# Patient Record
Sex: MALE | Race: BLACK OR AFRICAN AMERICAN | Employment: OTHER | URBAN - METROPOLITAN AREA
[De-identification: names, ages, dates, MRNs, and addresses within clinical notes are randomized per-mention and may not be internally consistent; named-entity substitution may affect disease eponyms.]

---

## 2017-01-26 RX ORDER — LISINOPRIL 5 MG/1
TABLET ORAL
Qty: 30 TAB | Refills: 6 | Status: SHIPPED | OUTPATIENT
Start: 2017-01-26 | End: 2017-08-31 | Stop reason: SDUPTHER

## 2017-02-18 DIAGNOSIS — I10 HYPERTENSION, ESSENTIAL: ICD-10-CM

## 2017-02-20 RX ORDER — AMLODIPINE BESYLATE 5 MG/1
TABLET ORAL
Qty: 90 TAB | Refills: 3 | Status: SHIPPED | OUTPATIENT
Start: 2017-02-20 | End: 2018-02-15 | Stop reason: SDUPTHER

## 2017-03-20 RX ORDER — POTASSIUM CHLORIDE 750 MG/1
TABLET, EXTENDED RELEASE ORAL
Qty: 30 TAB | Refills: 3 | Status: SHIPPED | OUTPATIENT
Start: 2017-03-20 | End: 2017-11-27 | Stop reason: ALTCHOICE

## 2017-08-28 ENCOUNTER — HOSPITAL ENCOUNTER (OUTPATIENT)
Dept: LAB | Age: 77
Discharge: HOME | End: 2017-08-28
Payer: MEDICARE

## 2017-08-28 ENCOUNTER — OFFICE VISIT (OUTPATIENT)
Dept: UROLOGY | Age: 77
End: 2017-08-28

## 2017-08-28 VITALS
HEIGHT: 69 IN | BODY MASS INDEX: 31.7 KG/M2 | DIASTOLIC BLOOD PRESSURE: 75 MMHG | HEART RATE: 65 BPM | SYSTOLIC BLOOD PRESSURE: 130 MMHG | TEMPERATURE: 97.1 F | WEIGHT: 214 LBS | OXYGEN SATURATION: 100 %

## 2017-08-28 DIAGNOSIS — C61 PROSTATE CANCER (HCC): Primary | ICD-10-CM

## 2017-08-28 DIAGNOSIS — C61 PROSTATE CANCER (HCC): ICD-10-CM

## 2017-08-28 LAB
BILIRUB UR QL STRIP: NEGATIVE
GLUCOSE UR-MCNC: NEGATIVE MG/DL
KETONES P FAST UR STRIP-MCNC: NEGATIVE MG/DL
PH UR STRIP: 7 [PH] (ref 4.6–8)
PROT UR QL STRIP: NEGATIVE MG/DL
PSA SERPL-MCNC: 1.2 NG/ML (ref 0–4)
SP GR UR STRIP: 1.01 (ref 1–1.03)
UA UROBILINOGEN AMB POC: NORMAL (ref 0.2–1)
URINALYSIS CLARITY POC: CLEAR
URINALYSIS COLOR POC: YELLOW
URINE BLOOD POC: NEGATIVE
URINE LEUKOCYTES POC: NEGATIVE
URINE NITRITES POC: NEGATIVE

## 2017-08-28 PROCEDURE — 84153 ASSAY OF PSA TOTAL: CPT | Performed by: UROLOGY

## 2017-08-28 NOTE — PROGRESS NOTES
Pia Naas 65 y.o. male     Mr. Fontenot Members seen today for follow-up prostate carcinoma Janusz 6 histology/PSA 4.4 IM RT 2013    Patient is voiding well and has no complaints regarding urination at this time      PSA 2.6 in February 2014  PSA 1.2 in August 2014   PSA 0.7 in August 2015  PSA 1.0 in August 2016        Review of Systems:   CNS: No seizure syncope headaches dizziness or visual changes  Respiratory-no wheezing no shortness of breath  Cardiovascular:hypertension-no chest pains no palpitations  Intestinal:negative no dyspepsia diarrhea or constipation  Urinary: prostate carcinoma 2013  Skeletal: No large joint pain or muscle complaints  Endocrine:negative diabetes  Other:    Allergies: Allergies   Allergen Reactions    Pcn [Penicillins] Rash and Unknown (comments)     Other reaction(s): unknown      Medications:    Current Outpatient Prescriptions   Medication Sig Dispense Refill    amLODIPine (NORVASC) 5 mg tablet take 1 tablet by mouth once daily 90 Tab 3    lisinopril (PRINIVIL, ZESTRIL) 5 mg tablet take 1 tablet by mouth once daily 30 Tab 6    hydroCHLOROthiazide (HYDRODIURIL) 12.5 mg tablet TAKE 1 TABLET BY MOUTH EVERY OTHER DAY 30 Tab 6    metoprolol (LOPRESSOR) 50 mg tablet Take  by mouth two (2) times a day.  simvastatin (ZOCOR) 20 mg tablet Take  by mouth nightly.  aspirin delayed-release 81 mg tablet Take  by mouth daily.  potassium chloride (K-DUR, KLOR-CON) 10 mEq tablet take 1 tablet by mouth every other day 30 Tab 3    econazole nitrate (SPECTAZOLE) 1 % topical cream as needed. Past Medical History:   Diagnosis Date    Cancer of prostate ([facility] [address] 4/2012    radiation- Dr Janessa Padilla Essential hypertension     Gout     Obesity, unspecified     Weight loss has been strongly encouraged by following dietary restrictions and an exercise routine.     Other and unspecified hyperlipidemia     1/12 Low density lipoproteins (LDLs) are at goal, triglycerides are at goal, high density lipoproteins (HDLs) are at goal.      History reviewed. No pertinent surgical history. Family History   Problem Relation Age of Onset    Heart Attack Father 54    Heart Disease Other      Positive family history of heart disesae    Sudden Death Neg Hx         Physical Examination: Well-nourished mature male in no apparent distress    Prostate by OSKAR is large rounded smooth benign consistency nontender-no induration no nodularity  No rectal masses induration or tenderness    Urinalysis: Negative dipstick/nitrite negative    Impression: Prostate carcinoma OZZY 40 years status post IM RT        Plan: PSA today    rtc 1 yr      More than 1/2 of this 15 minute visit was spent in counselling and coordination of care, as described above. Juany Dean MD  -electronically signed-    PLEASE NOTE:  This document has been produced using voice recognition software. Unrecognized errors in transcription may be present.

## 2017-08-28 NOTE — PROGRESS NOTES
Mr. Melissa Troncoso has a reminder for a \"due or due soon\" health maintenance. I have asked that he contact his primary care provider for follow-up on this health maintenance.

## 2017-08-28 NOTE — MR AVS SNAPSHOT
Visit Information Date & Time Provider Department Dept. Phone Encounter #  
 8/28/2017  9:45 AM Kandice Chatman, Yoh[facility] [id number] Your Appointments 10/30/2017  9:15 AM  
ESTABLISHED PATIENT with Tam Chavez MD  
[facility] ([address]) Appt Note: 1 year follow up/Lipid/LFT/A1C  
 [address] [facility] [address]  
  
   
 [address] [address] [address] [address]  
  
    
 8/28/2018  9:30 AM  
Office Visit with Kandice Chatman MD  
[facility] [address]) Appt Note: PSA  
 [address] [place] [address] [address]  
[phone] [address] [place] [address] Upcoming Health Maintenance Date Due DTaP/Tdap/Td series (1 - Tdap) 4/14/1960 ZOSTER VACCINE AGE 60> 2/14/1999 GLAUCOMA SCREENING Q2Y 4/14/2004 Pneumococcal 65+ High/Highest Risk (1 of 2 - PCV13) 4/14/2004 MEDICARE YEARLY EXAM 4/14/2004 INFLUENZA AGE 9 TO ADULT 8/1/2017 Allergies as of 8/28/2017  Review Complete On: 8/28/2017 By: Kandice Chatman MD  
  
 Severity Noted Reaction Type Reactions Pcn [Penicillins]  06/02/2011   Side Effect Rash, Unknown (comments) Other reaction(s): unknown Current Immunizations  Never Reviewed No immunizations on file. Not reviewed this visit You Were Diagnosed With   
  
 Codes Comments Prostate cancer [facility])    -  Primary ICD-10-CM: D61 ICD-9-CM: 971 Vitals BP Pulse Temp Height(growth percentile) Weight(growth percentile) SpO2  
 130/75 (BP 1 Location: Left arm, BP Patient Position: Sitting) 65 97.1 °F (36.2 °C) 5' 9\" (1.753 m) 214 lb (97.1 kg) 100% BMI Smoking Status 31.6 kg/m2 Never Smoker Vitals History BMI and BSA Data Body Mass Index Body Surface Area  
 31.6 kg/m 2 2.17 m 2 Preferred Pharmacy Pharmacy Name Phone [facility] [address] [facility], [facility] [address] [phone] Your Updated Medication List  
  
   
This list is accurate as of: 8/28/17 10:24 AM.  Always use your most recent med list. amLODIPine 5 mg tablet Commonly known as:  NORVASC  
take 1 tablet by mouth once daily  
  
 aspirin delayed-release 81 mg tablet Take  by mouth daily. econazole nitrate 1 % topical cream  
Commonly known as:  Orvilla Munda  
as needed. hydroCHLOROthiazide 12.5 mg tablet Commonly known as:  HYDRODIURIL  
TAKE 1 TABLET BY MOUTH EVERY OTHER DAY  
  
 lisinopril 5 mg tablet Commonly known as:  PRINIVIL, ZESTRIL  
take 1 tablet by mouth once daily  
  
 metoprolol tartrate 50 mg tablet Commonly known as:  LOPRESSOR Take  by mouth two (2) times a day. potassium chloride 10 mEq tablet Commonly known as:  KLOR-CON  
take 1 tablet by mouth every other day  
  
 simvastatin 20 mg tablet Commonly known as:  ZOCOR Take  by mouth nightly. We Performed the Following AMB POC URINALYSIS DIP STICK AUTO W/O MICRO [98941 CPT(R)] IL COLLECTION VENOUS BLOOD,VENIPUNCTURE [id number] CPT(R)] To-Do List   
 08/28/2017 Lab:  PSA, DIAGNOSTIC (PROSTATE SPECIFIC AG) Patient Instructions Prostate Cancer: Care Instructions Your Care Instructions The prostate gland is a small, walnut-shaped organ that lies just below a man's bladder. It surrounds the urethra, the tube that carries urine from the bladder out of the body through the penis. Prostate cancer is the abnormal growth of cells in the prostate gland. Prostate cancer cells can spread within the prostate, to nearby lymph nodes and other tissues, and to other parts of the body. When the cancer hasn't spread outside the prostate, it is called localized prostate cancer. With localized prostate cancer, your options depend on how likely it is that your cancer will grow.  Tests will show if your cancer is likely to grow. · Low-risk cancer isn't likely to grow right away. If your cancer is low-risk, you can choose active surveillance. This means your cancer will be watched closely by your doctor with regular checkups and tests to see if the cancer grows. This choice allows you to delay having surgery or radiation, often for many years. If the cancer grows very slowly, you may never need treatment. · Medium-risk cancer is more likely to grow. Some men with this type of cancer may be able to choose active surveillance. Others may need to choose surgery or radiation. · High-risk cancer is most likely to grow. If you have high-risk cancer, you will likely need to choose surgery or radiation. If your cancer has already spread outside the prostate or to other parts of the body, then you may have other treatments, like chemotherapy or hormone therapy. If you are over age [de-identified] or have other serious health problems, like heart disease, you may choose not to have treatments to cure your cancer. Instead, you can just have treatments to manage your symptoms. This is called watchful waiting. Finding out that you have cancer is scary. You may feel many emotions and may need some help coping. Seek out family, friends, and counselors for support. You also can do things at home to make yourself feel better while you go through treatment. Call the [facility] (9-945.395.9996) or visit its website at [employer] [employer]. [employer] for more information. Follow-up care is a key part of your treatment and safety. Be sure to make and go to all appointments, and call your doctor if you are having problems. It's also a good idea to know your test results and keep a list of the medicines you take. How can you care for yourself at home? · Your doctor will talk to you about your treatment options. You may need to learn more about each of them before you can decide which treatment is best for you. · Take your medicines exactly as prescribed. Call your doctor if you think you are having a problem with your medicine. You will get more details on the specific medicines your doctor prescribes. · Eat healthy food. If you do not feel like eating, try to eat food that has protein and extra calories to keep up your strength and prevent weight loss. Drink liquid meal replacements for extra calories and protein. Try to eat your main meal early. · Take steps to control your stress and workload. Learn relaxation techniques. ¨ Share your feelings. Stress and tension affect our emotions. By expressing your feelings to others, you may be able to understand and cope with them. ¨ Consider joining a support group. Talking about a problem with your spouse, a good friend, or other people with similar problems is a good way to reduce tension and stress. ¨ Express yourself through art. Try writing, crafts, dance, or art to relieve stress. Some dance, writing, or art groups may be available just for people who have cancer. ¨ Be kind to your body and mind. Getting enough sleep, eating a healthy diet, and taking time to do things you enjoy can contribute to an overall feeling of balance in your life and can help reduce stress. ¨ Get help if you need it. Discuss your concerns with your doctor or counselor. · Get some physical activity every day, but do not get too tired. Keep doing the hobbies you enjoy as your energy allows. · If you are vomiting or have diarrhea: ¨ Drink plenty of fluids (enough so that your urine is light yellow or clear like water) to prevent dehydration. Choose water and other caffeine-free clear liquids. If you have kidney, heart, or liver disease and have to limit fluids, talk with your doctor before you increase the amount of fluids you drink. ¨ When you are able to eat, try clear soups, mild foods, and liquids until all symptoms are gone for 12 to 48 hours.  Jell-O, dry toast, crackers, and cooked cereal are also good choices. · If you have not already done so, prepare a list of advance directives. Advance directives are instructions to your doctor and family members about what kind of care you want if you become unable to speak or express yourself. When should you call for help? Call your doctor now or seek immediate medical care if: 
· You cannot urinate. · You have symptoms of a urinary infection. For example: ¨ You have blood or pus in your urine. ¨ You have pain in your back just below your rib cage. This is called flank pain. ¨ You have a fever, chills, or body aches. ¨ It hurts to urinate. ¨ You have groin or belly pain. · You have pain in your back or hips. · Your pain is not controlled. · You are vomiting or nauseated. Watch closely for changes in your health, and be sure to contact your doctor if: 
· You have pain when you ejaculate. · You have trouble starting or controlling your urine. Where can you learn more? Go to http://karlos-sammi.info/. Enter V141 in the search box to learn more about \"Prostate Cancer: Care Instructions. \" Current as of: July 26, 2016 Content Version: 11.3 © 2492-6905 [employer]. Care instructions adapted under license by [employer] (which disclaims liability or warranty for this information). If you have questions about a medical condition or this instruction, always ask your healthcare professional. [address] any warranty or liability for your use of this information. Introducing [facility]! [facility] introduces "[email or web address]" patient portal. Now you can access parts of your medical record, email your doctor's office, and request medication refills online. 1. In your internet browser, go to [email or web address] 2. Click on the First Time User? Click Here link in the Sign In box. You will see the New Member Sign Up page. 3. Enter your Sporting Mouth Access Code exactly as it appears below. You will not need to use this code after youve completed the sign-up process. If you do not sign up before the expiration date, you must request a new code. · Sporting Mouth Access Code: [email or web address] Expires: 11/26/2017  9:42 AM 
 
4. Enter the last four digits of your Social Security Number (xxxx) and Date of Birth (mm/dd/yyyy) as indicated and click Submit. You will be taken to the next sign-up page. 5. Create a SendHubt ID. This will be your Sporting Mouth login ID and cannot be changed, so think of one that is secure and easy to remember. 6. Create a Sporting Mouth password. You can change your password at any time. 7. Enter your Password Reset Question and Answer. This can be used at a later time if you forget your password. 8. Enter your e-mail address. You will receive e-mail notification when new information is available in [address] 9. Click Sign Up. You can now view and download portions of your medical record. 10. Click the Download Summary menu link to download a portable copy of your medical information. If you have questions, please visit the Frequently Asked Questions section of the Sporting Mouth website. Remember, Sporting Mouth is NOT to be used for urgent needs. For medical emergencies, dial 911. Now available from your iPhone and Android! Please provide this summary of care documentation to your next provider. Your primary care clinician is listed as Socorro Montano. If you have any questions after today's visit, please call [phone].

## 2017-08-28 NOTE — PATIENT INSTRUCTIONS
Prostate Cancer: Care Instructions  Your Care Instructions    The prostate gland is a small, walnut-shaped organ that lies just below a man's bladder. It surrounds the urethra, the tube that carries urine from the bladder out of the body through the penis. Prostate cancer is the abnormal growth of cells in the prostate gland. Prostate cancer cells can spread within the prostate, to nearby lymph nodes and other tissues, and to other parts of the body. When the cancer hasn't spread outside the prostate, it is called localized prostate cancer. With localized prostate cancer, your options depend on how likely it is that your cancer will grow. Tests will show if your cancer is likely to grow. · Low-risk cancer isn't likely to grow right away. If your cancer is low-risk, you can choose active surveillance. This means your cancer will be watched closely by your doctor with regular checkups and tests to see if the cancer grows. This choice allows you to delay having surgery or radiation, often for many years. If the cancer grows very slowly, you may never need treatment. · Medium-risk cancer is more likely to grow. Some men with this type of cancer may be able to choose active surveillance. Others may need to choose surgery or radiation. · High-risk cancer is most likely to grow. If you have high-risk cancer, you will likely need to choose surgery or radiation. If your cancer has already spread outside the prostate or to other parts of the body, then you may have other treatments, like chemotherapy or hormone therapy. If you are over age [de-identified] or have other serious health problems, like heart disease, you may choose not to have treatments to cure your cancer. Instead, you can just have treatments to manage your symptoms. This is called watchful waiting. Finding out that you have cancer is scary. You may feel many emotions and may need some help coping. Seek out family, friends, and counselors for support.  You also can do things at home to make yourself feel better while you go through treatment. Call the [facility] (5-471.507.9294) or visit its website at [employer] [employer]. [employer] for more information. Follow-up care is a key part of your treatment and safety. Be sure to make and go to all appointments, and call your doctor if you are having problems. It's also a good idea to know your test results and keep a list of the medicines you take. How can you care for yourself at home? · Your doctor will talk to you about your treatment options. You may need to learn more about each of them before you can decide which treatment is best for you. · Take your medicines exactly as prescribed. Call your doctor if you think you are having a problem with your medicine. You will get more details on the specific medicines your doctor prescribes. · Eat healthy food. If you do not feel like eating, try to eat food that has protein and extra calories to keep up your strength and prevent weight loss. Drink liquid meal replacements for extra calories and protein. Try to eat your main meal early. · Take steps to control your stress and workload. Learn relaxation techniques. ¨ Share your feelings. Stress and tension affect our emotions. By expressing your feelings to others, you may be able to understand and cope with them. ¨ Consider joining a support group. Talking about a problem with your spouse, a good friend, or other people with similar problems is a good way to reduce tension and stress. ¨ Express yourself through art. Try writing, crafts, dance, or art to relieve stress. Some dance, writing, or art groups may be available just for people who have cancer. ¨ Be kind to your body and mind. Getting enough sleep, eating a healthy diet, and taking time to do things you enjoy can contribute to an overall feeling of balance in your life and can help reduce stress. ¨ Get help if you need it.  Discuss your concerns with your doctor or counselor. · Get some physical activity every day, but do not get too tired. Keep doing the hobbies you enjoy as your energy allows. · If you are vomiting or have diarrhea:  ¨ Drink plenty of fluids (enough so that your urine is light yellow or clear like water) to prevent dehydration. Choose water and other caffeine-free clear liquids. If you have kidney, heart, or liver disease and have to limit fluids, talk with your doctor before you increase the amount of fluids you drink. ¨ When you are able to eat, try clear soups, mild foods, and liquids until all symptoms are gone for 12 to 48 hours. Jell-O, dry toast, crackers, and cooked cereal are also good choices. · If you have not already done so, prepare a list of advance directives. Advance directives are instructions to your doctor and family members about what kind of care you want if you become unable to speak or express yourself. When should you call for help? Call your doctor now or seek immediate medical care if:  · You cannot urinate. · You have symptoms of a urinary infection. For example:  ¨ You have blood or pus in your urine. ¨ You have pain in your back just below your rib cage. This is called flank pain. ¨ You have a fever, chills, or body aches. ¨ It hurts to urinate. ¨ You have groin or belly pain. · You have pain in your back or hips. · Your pain is not controlled. · You are vomiting or nauseated. Watch closely for changes in your health, and be sure to contact your doctor if:  · You have pain when you ejaculate. · You have trouble starting or controlling your urine. Where can you learn more? Go to http://karlos-sammi.info/. Enter V141 in the search box to learn more about \"Prostate Cancer: Care Instructions. \"  Current as of: July 26, 2016  Content Version: 11.3  © 7782-4946 [employer].  Care instructions adapted under license by [employer] (which disclaims liability or warranty for this information). If you have questions about a medical condition or this instruction, always ask your healthcare professional. [place] [address] any warranty or liability for your use of this information.

## 2017-08-31 RX ORDER — LISINOPRIL 5 MG/1
TABLET ORAL
Qty: 30 TAB | Refills: 6 | Status: SHIPPED | OUTPATIENT
Start: 2017-08-31 | End: 2018-03-26 | Stop reason: SDUPTHER

## 2017-11-27 ENCOUNTER — OFFICE VISIT (OUTPATIENT)
Dept: CARDIOLOGY CLINIC | Age: 77
End: 2017-11-27

## 2017-11-27 VITALS
DIASTOLIC BLOOD PRESSURE: 67 MMHG | HEIGHT: 69 IN | SYSTOLIC BLOOD PRESSURE: 132 MMHG | BODY MASS INDEX: 31.7 KG/M2 | HEART RATE: 74 BPM | WEIGHT: 214 LBS

## 2017-11-27 DIAGNOSIS — E78.00 PURE HYPERCHOLESTEROLEMIA: ICD-10-CM

## 2017-11-27 DIAGNOSIS — E66.9 OBESITY (BMI 30.0-34.9): ICD-10-CM

## 2017-11-27 DIAGNOSIS — I10 ESSENTIAL HYPERTENSION, BENIGN: ICD-10-CM

## 2017-11-27 DIAGNOSIS — I25.10 ATHEROSCLEROSIS OF NATIVE CORONARY ARTERY OF NATIVE HEART WITHOUT ANGINA PECTORIS: Primary | ICD-10-CM

## 2017-11-27 RX ORDER — DEXTROMETHORPHAN HYDROBROMIDE, GUAIFENESIN 5; 100 MG/5ML; MG/5ML
650 LIQUID ORAL EVERY 8 HOURS
COMMUNITY
End: 2018-11-26

## 2017-11-27 NOTE — MR AVS SNAPSHOT
Visit Information Date & Time Provider Department Dept. Phone Encounter #  
 11/27/2017  9:00 AM Napoleon Rios MD [facility] [phone] Follow-up Instructions Return in about 1 year (around 11/27/2018), or if symptoms worsen or fail to improve, for with ekg, post test.  
  
Your Appointments 8/28/2018  9:30 AM  
Office Visit with Gayle Figueroa MD  
[facility] [address] [facility]) Appt Note: PSA  
 [address] [place] [address] [address]  
[phone] [address] [facility] [address] Upcoming Health Maintenance Date Due DTaP/Tdap/Td series (1 - Tdap) 4/14/1960 ZOSTER VACCINE AGE 60> 2/14/1999 GLAUCOMA SCREENING Q2Y 4/14/2004 Pneumococcal 65+ High/Highest Risk (1 of 2 - PCV13) 4/14/2004 MEDICARE YEARLY EXAM 4/14/2004 Influenza Age 5 to Adult 8/1/2017 Allergies as of 11/27/2017  Review Complete On: 11/27/2017 By: Napoleon Rios MD  
  
 Severity Noted Reaction Type Reactions Pcn [Penicillins]  06/02/2011   Side Effect Rash, Unknown (comments) Other reaction(s): unknown Current Immunizations  Never Reviewed No immunizations on file. Not reviewed this visit You Were Diagnosed With   
  
 Codes Comments Atherosclerosis of native coronary artery of native heart without angina pectoris    -  Primary ICD-10-CM: I25.10 ICD-9-CM: 414.01 11/17; 11/16 stable symptoms; 2006 noncritical 
stable EKG Essential hypertension, benign     ICD-10-CM: I10 
ICD-9-CM: 401.1 Pure hypercholesterolemia     ICD-10-CM: E78.00 ICD-9-CM: 272.0 11/16 LDL74; 9/15 LDL72;   
 Obesity (BMI 30.0-34.9)     ICD-10-CM: M08.9 ICD-9-CM: 278.00 Weight loss has been strongly encouraged by following dietary restrictions and an exercise routine. Vitals BP Pulse Height(growth percentile) Weight(growth percentile) BMI Smoking Status 132/67 74 5' 9\" (1.753 m) 214 lb (97.1 kg) 31.6 kg/m2 Never Smoker Vitals History BMI and BSA Data Body Mass Index Body Surface Area  
 31.6 kg/m 2 2.17 m 2 Preferred Pharmacy Pharmacy Name Phone [facility] [facility] [address] [phone] Your Updated Medication List  
  
   
This list is accurate as of: 11/27/17  9:36 AM.  Always use your most recent med list.  
  
  
  
  
 acetaminophen 650 mg Tber Commonly known as:  TYLENOL Take 650 mg by mouth every eight (8) hours. amLODIPine 5 mg tablet Commonly known as:  NORVASC  
take 1 tablet by mouth once daily  
  
 aspirin delayed-release 81 mg tablet Take  by mouth daily. hydroCHLOROthiazide 12.5 mg tablet Commonly known as:  HYDRODIURIL  
TAKE 1 TABLET BY MOUTH EVERY OTHER DAY  
  
 lisinopril 5 mg tablet Commonly known as:  PRINIVIL, ZESTRIL  
take 1 tablet by mouth once daily  
  
 metoprolol tartrate 50 mg tablet Commonly known as:  LOPRESSOR Take  by mouth two (2) times a day. simvastatin 20 mg tablet Commonly known as:  ZOCOR Take  by mouth nightly. We Performed the Following AMB POC EKG ROUTINE W/ 12 LEADS, INTER & REP [08800 CPT(R)] Follow-up Instructions Return in about 1 year (around 11/27/2018), or if symptoms worsen or fail to improve, for with ekg, post test.  
  
To-Do List   
 Around 11/27/2017 Lab:  CBC W/O DIFF Around 11/27/2017 Lab:  HEPATIC FUNCTION PANEL Around 11/27/2017 Lab:  LIPID PANEL Around 11/27/2017 Lab:  METABOLIC PANEL, BASIC Patient Instructions Medications Discontinued During This Encounter Medication Reason  econazole nitrate (SPECTAZOLE) 1 % topical cream Therapy Completed  potassium chloride (K-DUR, KLOR-CON) 10 mEq tablet Discontinued by Another Clinician Orders Placed This Encounter  LIPID PANEL Standing Status:   Future Standing Expiration Date:   2/27/2018  
 HEPATIC FUNCTION PANEL Standing Status:   Future Standing Expiration Date:   2/27/2018  METABOLIC PANEL, BASIC Standing Status:   Future Standing Expiration Date:   2/27/2018  CBC W/O DIFF Standing Status:   Future Standing Expiration Date:   2/27/2018  AMB POC EKG ROUTINE W/ 12 LEADS, INTER & REP Order Specific Question:   Reason for Exam: Answer:   1year follow up Body Mass Index: Care Instructions Your Care Instructions Body mass index (BMI) can help you see if your weight is raising your risk for health problems. It uses a formula to compare how much you weigh with how tall you are. · A BMI lower than 18.5 is considered underweight. · A BMI between 18.5 and 24.9 is considered healthy. · A BMI between 25 and 29.9 is considered overweight. A BMI of 30 or higher is considered obese. If your BMI is in the normal range, it means that you have a lower risk for weight-related health problems. If your BMI is in the overweight or obese range, you may be at increased risk for weight-related health problems, such as high blood pressure, heart disease, stroke, arthritis or joint pain, and diabetes. If your BMI is in the underweight range, you may be at increased risk for health problems such as fatigue, lower protection (immunity) against illness, muscle loss, bone loss, hair loss, and hormone problems. BMI is just one measure of your risk for weight-related health problems. You may be at higher risk for health problems if you are not active, you eat an unhealthy diet, or you drink too much alcohol or use tobacco products. Follow-up care is a key part of your treatment and safety. Be sure to make and go to all appointments, and call your doctor if you are having problems. It's also a good idea to know your test results and keep a list of the medicines you take. How can you care for yourself at home? · Practice healthy eating habits. This includes eating plenty of fruits, vegetables, whole grains, lean protein, and low-fat dairy. · If your doctor recommends it, get more exercise. Walking is a good choice. Bit by bit, increase the amount you walk every day. Try for at least 30 minutes on most days of the week. · Do not smoke. Smoking can increase your risk for health problems. If you need help quitting, talk to your doctor about stop-smoking programs and medicines. These can increase your chances of quitting for good. · Limit alcohol to 2 drinks a day for men and 1 drink a day for women. Too much alcohol can cause health problems. If you have a BMI higher than 25 · Your doctor may do other tests to check your risk for weight-related health problems. This may include measuring the distance around your waist. A waist measurement of more than 40 inches in men or 35 inches in women can increase the risk of weight-related health problems. · Talk with your doctor about steps you can take to stay healthy or improve your health. You may need to make lifestyle changes to lose weight and stay healthy, such as changing your diet and getting regular exercise. If you have a BMI lower than 18.5 · Your doctor may do other tests to check your risk for health problems. · Talk with your doctor about steps you can take to stay healthy or improve your health. You may need to make lifestyle changes to gain or maintain weight and stay healthy, such as getting more healthy foods in your diet and doing exercises to build muscle. Where can you learn more? Go to http://karlos-sammi.info/. Enter S176 in the search box to learn more about \"Body Mass Index: Care Instructions. \" Current as of: October 13, 2016 Content Version: 11.4 © 5008-4937 [employer].  Care instructions adapted under license by [employer] (which disclaims liability or warranty for this information). If you have questions about a medical condition or this instruction, always ask your healthcare professional. [address] any warranty or liability for your use of this information. Introducing [facility] SERVICES! Mathew Jolly introduces Adzilla patient portal. Now you can access parts of your medical record, email your doctor's office, and request medication refills online. 1. In your internet browser, go to [email or web address] 2. Click on the First Time User? Click Here link in the Sign In box. You will see the New Member Sign Up page. 3. Enter your Adzilla Access Code exactly as it appears below. You will not need to use this code after youve completed the sign-up process. If you do not sign up before the expiration date, you must request a new code. · Adzilla Access Code: [email or web address] Expires: 2/25/2018  8:44 AM 
 
4. Enter the last four digits of your Social Security Number (xxxx) and Date of Birth (mm/dd/yyyy) as indicated and click Submit. You will be taken to the next sign-up page. 5. Create a Adzilla ID. This will be your Adzilla login ID and cannot be changed, so think of one that is secure and easy to remember. 6. Create a Adzilla password. You can change your password at any time. 7. Enter your Password Reset Question and Answer. This can be used at a later time if you forget your password. 8. Enter your e-mail address. You will receive e-mail notification when new information is available in [address]. 9. Click Sign Up. You can now view and download portions of your medical record. 10. Click the Download Summary menu link to download a portable copy of your medical information. If you have questions, please visit the Frequently Asked Questions section of the Adzilla website. Remember, Adzilla is NOT to be used for urgent needs. For medical emergencies, dial 911. Now available from your iPhone and Android! Please provide this summary of care documentation to your next provider. Your primary care clinician is listed as [place]ont Keon. If you have any questions after today's visit, please call [phone].

## 2017-11-27 NOTE — PROGRESS NOTES
1. Have you been to the ER, urgent care clinic since your last visit? Hospitalized since your last visit?    no    2. Have you seen or consulted any other health care providers outside of the [address] [place] [address] since your last visit? Include any pap smears or colon screening. Yes, pcp    3. Since your last visit, have you had any of the following symptoms? Some swelling         4. Have you had any blood work, X-rays or cardiac testing?     no       5. Where do you normally have your labs drawn? [facility]    6. Do you need any refills today?    no

## 2017-11-27 NOTE — LETTER
Quin[place]moshe Fuelling 1940 11/27/2017 Dear MD Gayle Javed MD 
 
I had the pleasure of evaluating  Mr. Maria Guadalupe Feliciano in office today. Below are the relevant portions of my assessment and plan of care. ICD-10-CM ICD-9-CM 1. Atherosclerosis of native coronary artery of native heart without angina pectoris I25.10 414.01   
 11/17; 11/16 stable symptoms; 2006 noncritical 
stable EKG 2. Essential hypertension, benign I10 401.1 AMB POC EKG ROUTINE W/ 12 LEADS, INTER & REP  
   METABOLIC PANEL, BASIC  
   CBC W/O DIFF 3. Pure hypercholesterolemia E78.00 272.0 LIPID PANEL  
   HEPATIC FUNCTION PANEL  
 11/16 LDL74; 9/15 LDL72;   
4. Obesity (BMI 30.0-34. 9) E66.9 278.00 Weight loss has been strongly encouraged by following dietary restrictions and an exercise routine. Current Outpatient Prescriptions Medication Sig Dispense Refill  acetaminophen (TYLENOL) 650 mg TbER Take 650 mg by mouth every eight (8) hours.  lisinopril (PRINIVIL, ZESTRIL) 5 mg tablet take 1 tablet by mouth once daily 30 Tab 6  
 amLODIPine (NORVASC) 5 mg tablet take 1 tablet by mouth once daily 90 Tab 3  
 hydroCHLOROthiazide (HYDRODIURIL) 12.5 mg tablet TAKE 1 TABLET BY MOUTH EVERY OTHER DAY 30 Tab 6  
 metoprolol (LOPRESSOR) 50 mg tablet Take  by mouth two (2) times a day.  simvastatin (ZOCOR) 20 mg tablet Take  by mouth nightly.  aspirin delayed-release 81 mg tablet Take  by mouth daily. Orders Placed This Encounter  LIPID PANEL Standing Status:   Future Standing Expiration Date:   2/27/2018  
 HEPATIC FUNCTION PANEL Standing Status:   Future Standing Expiration Date:   2/27/2018  METABOLIC PANEL, BASIC Standing Status:   Future Standing Expiration Date:   2/27/2018  CBC W/O DIFF Standing Status:   Future Standing Expiration Date:   2/27/2018  AMB POC EKG ROUTINE W/ 12 LEADS, INTER & REP  
 Order Specific Question:   Reason for Exam: Answer:   1year follow up  acetaminophen (TYLENOL) 650 mg TbER Sig: Take 650 mg by mouth every eight (8) hours. If you have questions, please do not hesitate to call me. I look forward to following Mr. Dariana Olivas along with you. Sincerely, Rosalinda Melendez MD

## 2017-11-27 NOTE — PROGRESS NOTES
HISTORY OF PRESENT ILLNESS  Emi Sanchez is a 65 y.o. male. HPI Comments: Left deltoid area pains off and on but likely positional as he quilts with extended arms; 5/16 resolved    Hypertension   The history is provided by the patient and medical records. This is a chronic problem. The problem has not changed since onset. Pertinent negatives include no chest pain and no shortness of breath. Cholesterol Problem   The history is provided by the medical records. This is a chronic problem. Pertinent negatives include no chest pain and no shortness of breath. Follow Up Chronic Condition   The history is provided by the medical records (cad). Pertinent negatives include no chest pain and no shortness of breath. Chest Pain (Angina)    The history is provided by the patient. This is a recurrent problem. The current episode started more than 1 week ago (1/16). The problem has been resolved. Duration of episode(s) is 3 minutes. The problem occurs every several days. The pain is associated with movement (positional, sitting in a recliner). The pain is present in the lateral region and left side. The quality of the pain is described as sharp. The pain does not radiate. Pertinent negatives include no claudication, no cough, no diaphoresis, no dizziness, no fever, no malaise/fatigue, no nausea, no orthopnea, no palpitations, no PND, no shortness of breath and no vomiting. He has tried rest for the symptoms. The treatment provided significant relief. Review of Systems   Constitutional: Negative for chills, diaphoresis, fever, malaise/fatigue and weight loss. HENT: Negative for nosebleeds. Eyes: Negative for discharge. Respiratory: Negative for cough, shortness of breath and wheezing. Cardiovascular: Negative for chest pain, palpitations, orthopnea, claudication, leg swelling and PND. Gastrointestinal: Negative for diarrhea, nausea and vomiting. Genitourinary: Negative for dysuria and hematuria. Musculoskeletal: Negative for joint pain. Skin: Negative for rash. Neurological: Negative for dizziness, seizures and loss of consciousness. Endo/Heme/Allergies: Negative for polydipsia. Does not bruise/bleed easily. Psychiatric/Behavioral: Negative for depression and substance abuse. The patient does not have insomnia. Allergies   Allergen Reactions    Pcn [Penicillins] Rash and Unknown (comments)     Other reaction(s): unknown       Family History   Problem Relation Age of Onset    Heart Attack Father 54    Heart Disease Other      Positive family history of heart disesae    Sudden Death Neg Hx        Social History   Substance Use Topics    Smoking status: Never Smoker    Smokeless tobacco: Never Used    Alcohol use No        Current Outpatient Prescriptions   Medication Sig    acetaminophen (TYLENOL) 650 mg TbER Take 650 mg by mouth every eight (8) hours.  lisinopril (PRINIVIL, ZESTRIL) 5 mg tablet take 1 tablet by mouth once daily    amLODIPine (NORVASC) 5 mg tablet take 1 tablet by mouth once daily    hydroCHLOROthiazide (HYDRODIURIL) 12.5 mg tablet TAKE 1 TABLET BY MOUTH EVERY OTHER DAY    metoprolol (LOPRESSOR) 50 mg tablet Take  by mouth two (2) times a day.  simvastatin (ZOCOR) 20 mg tablet Take  by mouth nightly.  aspirin delayed-release 81 mg tablet Take  by mouth daily. No current facility-administered medications for this visit. History reviewed. No pertinent surgical history.     Diagnostic Studies:  CARDIOLOGY STUDIES 2/10/2016 9/19/2013 6/1/2010 10/1/2007 10/1/2006   EKG Result SR, WNL - - - -   Myocardial Perfusion Scan Result - - nl scan probably normal scan -   Cardiac Cath Result - - - - non critical CAD   Exercise Nuclear Stress Test Result - reduced capacity, fixed inf defect, nl EF - - -   Some recent data might be hidden       Visit Vitals    /67    Pulse 74    Ht 5' 9\" (1.753 m)    Wt 97.1 kg (214 lb)    BMI 31.6 kg/m2        Sujit Schulz has a reminder for a \"due or due soon\" health maintenance. I have asked that he contact his primary care provider for follow-up on this health maintenance. Physical Exam   Constitutional: He is oriented to person, place, and time. He appears well-developed and well-nourished. No distress. obese   HENT:   Head: Normocephalic and atraumatic. Mouth/Throat: Normal dentition. Eyes: Right eye exhibits no discharge. Left eye exhibits no discharge. No scleral icterus. Neck: Neck supple. No JVD present. Carotid bruit is not present. No thyromegaly present. Cardiovascular: Normal rate, regular rhythm, S1 normal, S2 normal, normal heart sounds and intact distal pulses. Exam reveals no gallop and no friction rub. No murmur heard. Pulmonary/Chest: Effort normal and breath sounds normal. He has no wheezes. He has no rales. Abdominal: Soft. He exhibits no mass. There is no tenderness. Musculoskeletal: He exhibits no edema. Lymphadenopathy:        Right cervical: No superficial cervical adenopathy present. Left cervical: No superficial cervical adenopathy present. Neurological: He is alert and oriented to person, place, and time. Skin: Skin is warm and dry. No rash noted. Psychiatric: He has a normal mood and affect. His behavior is normal.       ASSESSMENT and PLAN          Diagnoses and all orders for this visit:    1. Atherosclerosis of native coronary artery of native heart without angina pectoris  Comments:  11/17; 11/16 stable symptoms; 2006 noncritical  stable EKG    2. Essential hypertension, benign  -     AMB POC EKG ROUTINE W/ 12 LEADS, INTER & REP  -     METABOLIC PANEL, BASIC; Future  -     CBC W/O DIFF; Future    3. Pure hypercholesterolemia  Comments:  11/16 EGQ63; 9/15 FCN31;   Orders:  -     LIPID PANEL; Future  -     HEPATIC FUNCTION PANEL; Future    4. Obesity (BMI 30.0-34. 9)  Comments:  Weight loss has been strongly encouraged by following dietary restrictions and an exercise routine. Pertinent laboratory and test data reviewed and discussed with patient.   See patient instructions also for other medical advice given    Medications Discontinued During This Encounter   Medication Reason    econazole nitrate (SPECTAZOLE) 1 % topical cream Therapy Completed    potassium chloride (K-DUR, KLOR-CON) 10 mEq tablet Discontinued by Another Clinician       Follow-up Disposition:  Return in about 1 year (around 11/27/2018), or if symptoms worsen or fail to improve, for with ekg, post test.

## 2017-11-27 NOTE — PATIENT INSTRUCTIONS
Medications Discontinued During This Encounter   Medication Reason    econazole nitrate (SPECTAZOLE) 1 % topical cream Therapy Completed    potassium chloride (K-DUR, KLOR-CON) 10 mEq tablet Discontinued by Another Clinician       Orders Placed This Encounter    LIPID PANEL     Standing Status:   Future     Standing Expiration Date:   2/27/2018    HEPATIC FUNCTION PANEL     Standing Status:   Future     Standing Expiration Date:   2/61/1813    METABOLIC PANEL, BASIC     Standing Status:   Future     Standing Expiration Date:   2/27/2018    CBC W/O DIFF     Standing Status:   Future     Standing Expiration Date:   2/27/2018    AMB POC EKG ROUTINE W/ 12 LEADS, INTER & REP     Order Specific Question:   Reason for Exam:     Answer:   1year follow up          Body Mass Index: Care Instructions  Your Care Instructions    Body mass index (BMI) can help you see if your weight is raising your risk for health problems. It uses a formula to compare how much you weigh with how tall you are. · A BMI lower than 18.5 is considered underweight. · A BMI between 18.5 and 24.9 is considered healthy. · A BMI between 25 and 29.9 is considered overweight. A BMI of 30 or higher is considered obese. If your BMI is in the normal range, it means that you have a lower risk for weight-related health problems. If your BMI is in the overweight or obese range, you may be at increased risk for weight-related health problems, such as high blood pressure, heart disease, stroke, arthritis or joint pain, and diabetes. If your BMI is in the underweight range, you may be at increased risk for health problems such as fatigue, lower protection (immunity) against illness, muscle loss, bone loss, hair loss, and hormone problems. BMI is just one measure of your risk for weight-related health problems.  You may be at higher risk for health problems if you are not active, you eat an unhealthy diet, or you drink too much alcohol or use tobacco products. Follow-up care is a key part of your treatment and safety. Be sure to make and go to all appointments, and call your doctor if you are having problems. It's also a good idea to know your test results and keep a list of the medicines you take. How can you care for yourself at home? · Practice healthy eating habits. This includes eating plenty of fruits, vegetables, whole grains, lean protein, and low-fat dairy. · If your doctor recommends it, get more exercise. Walking is a good choice. Bit by bit, increase the amount you walk every day. Try for at least 30 minutes on most days of the week. · Do not smoke. Smoking can increase your risk for health problems. If you need help quitting, talk to your doctor about stop-smoking programs and medicines. These can increase your chances of quitting for good. · Limit alcohol to 2 drinks a day for men and 1 drink a day for women. Too much alcohol can cause health problems. If you have a BMI higher than 25  · Your doctor may do other tests to check your risk for weight-related health problems. This may include measuring the distance around your waist. A waist measurement of more than 40 inches in men or 35 inches in women can increase the risk of weight-related health problems. · Talk with your doctor about steps you can take to stay healthy or improve your health. You may need to make lifestyle changes to lose weight and stay healthy, such as changing your diet and getting regular exercise. If you have a BMI lower than 18.5  · Your doctor may do other tests to check your risk for health problems. · Talk with your doctor about steps you can take to stay healthy or improve your health. You may need to make lifestyle changes to gain or maintain weight and stay healthy, such as getting more healthy foods in your diet and doing exercises to build muscle. Where can you learn more? Go to http://karlos-sammi.info/.   Enter S176 in the search box to learn more about \"Body Mass Index: Care Instructions. \"  Current as of: October 13, 2016  Content Version: 11.4  © 5039-9853 [employer], [employer]. Care instructions adapted under license by [employer] (which disclaims liability or warranty for this information). If you have questions about a medical condition or this instruction, always ask your healthcare professional. [address] any warranty or liability for your use of this information.

## 2017-11-29 LAB
A-G RATIO,AGRAT: 1.5 RATIO (ref 1.1–2.6)
ALBUMIN SERPL-MCNC: 4.5 G/DL (ref 3.5–5)
ALP SERPL-CCNC: 58 U/L (ref 40–125)
ALT SERPL-CCNC: 14 U/L (ref 5–40)
ANION GAP SERPL CALC-SCNC: 10.2 MMOL/L
AST SERPL W P-5'-P-CCNC: 20 U/L (ref 10–37)
BILIRUB SERPL-MCNC: 1 MG/DL (ref 0.2–1.2)
BILIRUBIN, DIRECT,CBIL: <0.2 MG/DL (ref 0–0.3)
BUN SERPL-MCNC: 13 MG/DL (ref 6–22)
CALCIUM SERPL-MCNC: 9.8 MG/DL (ref 8.4–10.4)
CHLORIDE SERPL-SCNC: 101 MMOL/L (ref 98–110)
CHOLEST SERPL-MCNC: 142 MG/DL (ref 110–200)
CO2 SERPL-SCNC: 28 MMOL/L (ref 20–32)
CREAT SERPL-MCNC: 0.8 MG/DL (ref 0.8–1.6)
ERYTHROCYTE [DISTWIDTH] IN BLOOD BY AUTOMATED COUNT: 14.4 % (ref 10–16)
GFRAA, 66117: >60
GFRNA, 66118: >60
GLOBULIN,GLOB: 3 G/DL (ref 2–4)
GLUCOSE SERPL-MCNC: 97 MG/DL (ref 70–99)
HCT VFR BLD AUTO: 37.2 % (ref 37.8–52.2)
HDLC SERPL-MCNC: 62 MG/DL (ref 40–59)
HGB BLD-MCNC: 12.9 G/DL (ref 12.6–17.1)
LDLC SERPL CALC-MCNC: 66 MG/DL (ref 50–99)
MCH RBC QN AUTO: 31 PG (ref 26–34)
MCHC RBC AUTO-ENTMCNC: 35 G/DL (ref 32–36)
MCV RBC AUTO: 90 FL (ref 80–95)
PLATELET # BLD AUTO: 147 K/UL (ref 140–440)
PMV BLD AUTO: 11.8 FL (ref 6–10.8)
POTASSIUM SERPL-SCNC: 4 MMOL/L (ref 3.5–5.5)
PROT SERPL-MCNC: 7.5 G/DL (ref 6.2–8.1)
RBC # BLD AUTO: 4.13 M/UL (ref 3.8–5.8)
SODIUM SERPL-SCNC: 139 MMOL/L (ref 133–145)
TRIGL SERPL-MCNC: 69 MG/DL (ref 40–149)
VLDLC SERPL CALC-MCNC: 14 MG/DL (ref 8–30)
WBC # BLD AUTO: 5.8 K/UL (ref 4–11)

## 2018-01-23 ENCOUNTER — HOSPITAL ENCOUNTER (OUTPATIENT)
Dept: PHYSICAL THERAPY | Age: 78
Discharge: HOME | End: 2018-01-23
Payer: MEDICARE

## 2018-01-23 PROCEDURE — G8978 MOBILITY CURRENT STATUS: HCPCS

## 2018-01-23 PROCEDURE — G8979 MOBILITY GOAL STATUS: HCPCS

## 2018-01-23 PROCEDURE — 97140 MANUAL THERAPY 1/> REGIONS: CPT

## 2018-01-23 PROCEDURE — 97162 PT EVAL MOD COMPLEX 30 MIN: CPT

## 2018-01-23 PROCEDURE — 97110 THERAPEUTIC EXERCISES: CPT

## 2018-01-23 NOTE — PROGRESS NOTES
PT DAILY TREATMENT NOTE - [facility]     Patient Name: Toney Vaughn  Date:2018  : 1940  [x]  Patient  Verified  Payor: [place] MEDICARE / Plan: VA MEDICARE PART A & B / Product Type: Medicare /    In time:1200  Out time:1252  Total Treatment Time (min): 52  Total Timed Codes (min): 25  1:1 Treatment Time ([facility] only): 46   Visit #: 1 of 12    Treatment Area: Pain in right hip [M25.551]    Physical Therapy Evaluation - Hip    SUBJECTIVE      Any medication changes, allergies to medications, adverse drug reactions, diagnosis change, or new procedure performed?: [x] No    [] Yes (see summary sheet for update)    Subjective functional status/changes:     PLOF: No previous history of low back pain/hip pain, No history of sleep disturbances, No limitations in ambulation tolerance, Quilting, Retired  Current Functional Status: Limitations in ambulation tolerance  Work Hx: Retired (Former teacher)  Living Situation: Lives in [facility]  Comorbidities: Arthritis, BMI>30, Prostate Cancer (remission)  Medications: Tylenol Arthritis (PRN)    Pain Intensity (0-10, VAS): Current 2, Worst 2, Best 10    Patient Goals: \"Get rid of the pain\"    Posture/Observation:    BP: 138/82 mmHg  Gait:  [x] Normal        Functional Tests:  1.  SLS: L <2 sec / R <2 sec    Neurological Screen:  Reflexes: L/R Patellar 3+, L Achilles 2+, R Achilles 3+  Sensation: Intact, symmetrical sensation L2-S1 bilaterally  Other: (-) L/R Ankle Clonus, (-) L/R Babinski    Peripheral Neurodynamic Mobility:  (-) L/R Seated Slump Test  Supine SLR: (-) L, (+) R    Lumbar Screen: Flexion (normal), Extension (90% limited - Reproduction of R lateral leg pain), L Sidebend (90% limited), R Sidebend (90% limited - Reproduction of R lateral leg pain)             ROM/Strength   MMT (1-5)  Hip Left Right   Flexion 5 4   Extension NT NT   Abduction  2+   ER 5 5   IR 5 5   Knee Left Right   Extension 5 5   Flexion 5 5   Ankle DF 5 5   Hallux Ext 5 5     **Inability to complete testing in prone secondary to patient inability to assume position with reproduction of R LE pain with testing. Joint Mobility:  Sidelying Lumbar PPIVM: Poor interspinal mobility noted to lumbar region  Special Tests  Kevin/ Sukhi Test: [] Neg    [x] Pos  Eber Test:  [] Neg    [x] Pos  Trendelenberg:  [x] Neg    [] Pos [] Left    [] Right  JAILENE:            [] Neg    [x] Pos    OBJECTIVE    27 min [x]Eval                  []Re-Eval     9 min Therapeutic Exercise:  [x] See flow sheet : Patient educated regarding diagnosis and PT PoC with patient provided with written HEP instructions and educated regarding proper performance   Rationale: increase ROM and increase strength to improve the patients ability to improve ease with recreational hobbies    8 min Therapeutic Activity:  [x]  See flow sheet : Patient educated regarding modification of sleep positions and modifications with completion of functional ADLs to improve tolerance   Rationale: increase ROM, increase strength and improve coordination  to improve the patients ability to improve ease with community errands    8 min Manual Therapy:    L Sidelying, R Lumbar Gapping Grade II Mobilization (towel under lower lumbar)  Supine, R Hip Long-Axis Distraction (abduction, ER)   Rationale: decrease pain, increase ROM and increase tissue extensibility to improve ease with household ADLs          With   [] TE   [] TA   [] neuro   [] other: Patient Education: [x] Review HEP    [] Progressed/Changed HEP based on:   [] positioning   [] body mechanics   [] transfers   [] heat/ice application    [] other:      Pain Level (0-10 scale) post treatment: 2    ASSESSMENT/Changes in Function: Patient with signs and symptoms consistent with lumbar spinal stenosis with secondary R hip intra-articular impingement and deficits in R sciatic nerve neurodynamics.  Patient reports insidious onset of symptoms 2 months prior to IE with patient without PMH significant for low back pain. Patient reports pain primarily localized along the R gluteal fold with radiation of symptoms reported along the R lateral thigh and lower leg with patient denying LE numbness/tingling but reporting some episodes of R knee buckling. Patient denies LBP nor L LE symptoms. Increase in discomfort reported with completion of first few steps after sitting for a prolonged period of time and prolonged ambulation with patient reporting improved tolerance to ambulation with maintenance of lumbar flexion. Patient reports sleep disturbances secondary to pain with patient required to sleep in a recliner, with improved sleep quality reported. Patient objectively demonstrates reproduction of R LE radicular pain with lumbar extension with severe limitation demonstrated. Patient as well objectively noted with a (+) R hip intra-articular cluster and (+) R supine SLR with reproduction of R gluteal pain and R lateral LE radicular pain respectively. Patient with inability to lie prone secondary to immediate reproduction of R lateral thigh pain. Patient demonstrates a normal neurological screen with patient denying clumsiness, falls nor bowel/bladder changes. Emphasis of treatment to be placed on techniques to promote R lumbar facet gapping, improving R LE peripheral neurodynamic mobility and improving R hip capsular and muscular mobility. Patient will continue to benefit from skilled PT services to modify and progress therapeutic interventions, address functional mobility deficits, address ROM deficits, address strength deficits, analyze and address soft tissue restrictions, analyze and cue movement patterns and analyze and modify body mechanics/ergonomics to attain remaining goals. [x]  See Plan of Care  []  See progress note/recertification  []  See Discharge Summary         Progress towards goals / Updated goals:    Short Term Goals: To be accomplished in 3 weeks:  1.  Patient will subjectively report full compliance with prescribed HEP. Eval: HEP provided  2. Patient will demonstrate a (-) R supine SLR in order to improve ease with sleep tolerance. Eval: (+) R Supine SLR  3. Patient will demonstrate a 25% improvement in lumbar extension AROM in order to improve ease with overhead functional lifting. Eval: Lumbar Extension = 90% limited, Reproduction of R lateral radicular pain     Long Term Goals: To be accomplished in 6 weeks:  1. Patient will demonstrate a significant functional improvement as demonstrated by a score of >/=73 on FOTO. Eval: FOTO = 69  2. Patient will demonstrate L/R SLS >/=15 seconds in order to improve ease with ambulation on uneven surfaces. Eval: L SLS <2 sec, R SLS <2 sec  3. Patient will report >/=60% improvement in sleep quality in order to improve overall quality of life.   Eval: 0%, ~4 sleep disturbances/night subjectively reported    PLAN  [x]  Upgrade activities as tolerated     []  Continue plan of care  []  Update interventions per flow sheet       []  Discharge due to:_  []  Other:_      Solange Frank, PT 1/23/2018  9:40 AM    Future Appointments  Date Time Provider Kristine Chasity   1/23/2018 12:00 PM Karie Cr [facility] [id number]   8/28/2018 9:30 AM Rohan Mcgill MD [address] [place] [address]   11/15/2018 8:15 AM Jared Heller MD [address] [place] [address]

## 2018-01-23 NOTE — PROGRESS NOTES
[facility]              [address]        [place], [address] [place]   [phone] fax    Plan of Care/ Statement of Necessity for Physical Therapy Services    Patient name: Froylan Schafer Start of Care: 2018   Referral source: Donny Lisbeth : 1940    Medical Diagnosis: Pain in right hip [M25.551]   Onset Date:2 months prior to IE    Treatment Diagnosis: Lumbar Stenosis with R Sciatic Nerve Dysfunction and R Hip Intra-Articular Deficit   Prior Hospitalization: see medical history Provider#: [id number]   Medications: Verified on Patient summary List    Comorbidities: Arthritis, BMI>30, Prostate Cancer (remission)   Prior Level of Function: No previous history of low back pain/hip pain, No history of sleep disturbances, No limitations in ambulation tolerance, Quilting, Retired      UlKhadijah Mcpherson and following information is based on the information from the initial evaluation. Assessment:    Patient with signs and symptoms consistent with lumbar spinal stenosis with secondary R hip intra-articular impingement and deficits in R sciatic nerve neurodynamics. Patient reports insidious onset of symptoms 2 months prior to IE with patient without PMH significant for low back pain. Patient reports pain primarily localized along the R gluteal fold with radiation of symptoms reported along the R lateral thigh and lower leg with patient denying LE numbness/tingling but reporting some episodes of R knee buckling. Patient denies LBP nor L LE symptoms. Increase in discomfort reported with completion of first few steps after sitting for a prolonged period of time and prolonged ambulation with patient reporting improved tolerance to ambulation with maintenance of lumbar flexion. Patient reports sleep disturbances secondary to pain with patient required to sleep in a recliner, with improved sleep quality reported.  Patient objectively demonstrates reproduction of R LE radicular pain with lumbar extension with severe limitation demonstrated. Patient as well objectively noted with a (+) R hip intra-articular cluster and (+) R supine SLR with reproduction of R gluteal pain and R lateral LE radicular pain respectively. Patient with inability to lie prone secondary to immediate reproduction of R lateral thigh pain. Patient demonstrates a normal neurological screen with patient denying clumsiness, falls nor bowel/bladder changes. Emphasis of treatment to be placed on techniques to promote R lumbar facet gapping, improving R LE peripheral neurodynamic mobility and improving R hip capsular and muscular mobility.     Patient will continue to benefit from skilled PT services to modify and progress therapeutic interventions, address functional mobility deficits, address ROM deficits, address strength deficits, analyze and address soft tissue restrictions, analyze and cue movement patterns and analyze and modify body mechanics/ergonomics to attain remaining goals. Key Information:    BP: 138/82 mmHg  Gait:        [x] Normal         Functional Tests:  1.  SLS: L <2 sec / R <2 sec     Neurological Screen:  Reflexes: L/R Patellar 3+, L Achilles 2+, R Achilles 3+  Sensation: Intact, symmetrical sensation L2-S1 bilaterally  Other: (-) L/R Ankle Clonus, (-) L/R Babinski     Peripheral Neurodynamic Mobility:  (-) L/R Seated Slump Test  Supine SLR: (-) L, (+) R     Lumbar Screen: Flexion (normal), Extension (90% limited - Reproduction of R lateral leg pain), L Sidebend (90% limited), R Sidebend (90% limited - Reproduction of R lateral leg pain)           ROM/Strength       MMT (1-5)  Hip Left Right   Flexion 5 4   Extension NT NT   Abduction   2+   ER 5 5   IR 5 5   Knee Left Right   Extension 5 5   Flexion 5 5   Ankle DF 5 5   Hallux Ext 5 5      **Inability to complete testing in prone secondary to patient inability to assume position with reproduction of R LE pain with testing.     Joint Mobility:  Sidelying Lumbar PPIVM: Poor interspinal mobility noted to lumbar region  Special Tests  Kevin/ Sukhi Test:              [] Neg    [x] Pos  Eber Test:                        [] Neg    [x] Pos  Trendelenberg:                      [x] Neg    [] Pos               [] Left    [] Right  JAILENE:                                 [] Neg    [x] Pos     Evaluation Complexity History MEDIUM  Complexity : 1-2 comorbidities / personal factors will impact the outcome/ POC ; Examination MEDIUM Complexity : 3 Standardized tests and measures addressing body structure, function, activity limitation and / or participation in recreation  ;Presentation MEDIUM Complexity : Evolving with changing characteristics  ; Clinical Decision Making MEDIUM Complexity : FOTO score of 26-74  Overall Complexity Rating: MEDIUM  Problem List: pain affecting function, decrease ROM, decrease strength, impaired gait/ balance, decrease ADL/ functional abilitiies, decrease activity tolerance, decrease flexibility/ joint mobility and decrease transfer abilities   Treatment Plan may include any combination of the following: Therapeutic exercise, Therapeutic activities, Neuromuscular re-education, Physical agent/modality, Gait/balance training, Manual therapy, Patient education, Self Care training, Functional mobility training and Home safety training  Patient / Family readiness to learn indicated by: asking questions, trying to perform skills and interest  Persons(s) to be included in education: patient (P)  Barriers to Learning/Limitations: None  Patient Goal (s): Get rid of the pain  Patient Self Reported Health Status: good  Rehabilitation Potential: good    Short Term Goals: To be accomplished in 3 weeks:  1. Patient will subjectively report full compliance with prescribed HEP. 2. Patient will demonstrate a (-) R supine SLR in order to improve ease with sleep tolerance.   3. Patient will demonstrate a 25% improvement in lumbar extension AROM in order to improve ease with overhead functional lifting. Long Term Goals: To be accomplished in 6 weeks:  1. Patient will demonstrate a significant functional improvement as demonstrated by a score of >/=73 on FOTO. 2. Patient will demonstrate L/R SLS >/=15 seconds in order to improve ease with ambulation on uneven surfaces. 3. Patient will report >/=60% improvement in sleep quality in order to improve overall quality of life. Frequency / Duration: Patient to be seen 2 times per week for 6 weeks. Patient/ Caregiver education and instruction: Diagnosis, prognosis, self care, activity modification and exercises   [x]  Plan of care has been reviewed with PTA    G-Codes (GP)  Mobility   Current  CJ= 20-39%   Goal  CJ= 20-39%    The severity rating is based on clinical judgment and the FOTO score. Certification Period: 1/23/2018 - 3/24/2018  Mira Rivera, PT 1/23/2018 9:43 AM  ________________________________________________________________________    I certify that the above Therapy Services are being furnished while the patient is under my care. I agree with the treatment plan and certify that this therapy is necessary.     [de-identified] Signature:____________________  Date:____________Time: _________    Please sign and return to [facility]              [address] [place], [address] [place]   [phone] fax

## 2018-01-25 ENCOUNTER — HOSPITAL ENCOUNTER (OUTPATIENT)
Dept: PHYSICAL THERAPY | Age: 78
Discharge: HOME | End: 2018-01-25
Payer: MEDICARE

## 2018-01-25 PROCEDURE — 97112 NEUROMUSCULAR REEDUCATION: CPT

## 2018-01-25 PROCEDURE — 97140 MANUAL THERAPY 1/> REGIONS: CPT

## 2018-01-25 PROCEDURE — 97110 THERAPEUTIC EXERCISES: CPT

## 2018-01-25 NOTE — PROGRESS NOTES
PT DAILY TREATMENT NOTE - [facility]     Patient Name: Jack Kwong  Date:2018  : 1940  [x]  Patient  Verified  Payor: VA MEDICARE / Plan: VA MEDICARE PART A & B / Product Type: Medicare /    In time:201  Out time:250  Total Treatment Time (min): 49  Total Timed Codes (min): 39  1:1 Treatment Time ( [address] only): 44   Visit #: 2 of 12    Treatment Area: Pain in right hip [M25.551]    SUBJECTIVE  Pain Level (0-10 scale): 6-7  Any medication changes, allergies to medications, adverse drug reactions, diagnosis change, or new procedure performed?: [x] No    [] Yes (see summary sheet for update)  Subjective functional status/changes:   [] No changes reported  Patient reports completion of prescribed HEP 1x/day    OBJECTIVE    Modality rationale: decrease pain and increase tissue extensibility to improve the patients ability to improve ease with sleep   Min Type Additional Details   10 []  Ice     [x]  heat  []  Ice massage Position: Reclined  Location: Lumbar, Post-Tx     10 min Therapeutic Exercise:  [x] See flow sheet : Emphasis placed on improving lumbar AROM and strength of the gluteal musculature   Rationale: increase ROM and increase strength to improve the patients ability to improve ease with household ADLs     21 min Neuromuscular Re-education:  [x]  See flow sheet : Emphasis placed on proper activation of the anterior abdominal and gluteal musculature and pelvic proprioceptive awareness   Rationale: increase ROM, increase strength, improve balance and increase proprioception  to improve the patients ability to improve ease with prolonged ambulation    8 min Manual Therapy:    L Sidelying, R Lumbar Gapping Grade II Mobilization (towel under lower lumbar)  Supine, R Hip Lateral Grade IV Mobilization (w/ belt)   Rationale: decrease pain, increase ROM and increase tissue extensibility to improve ease with household ADLs        With   [] TE   [] TA   [] neuro   [] other: Patient Education: [x] Review HEP    [] Progressed/Changed HEP based on:   [] positioning   [] body mechanics   [] transfers   [] heat/ice application    [] other:      Pain Level (0-10 scale) post treatment: 0    ASSESSMENT/Changes in Function: Initiated treatment per PoC with good tolerance with review of current HEP to ensure proper form with completion. Patient demonstrates poor pelvic proprioceptive sense with patient benefiting from tactile cuing. Patient will continue to benefit from skilled PT services to modify and progress therapeutic interventions, address functional mobility deficits, address ROM deficits, address strength deficits, analyze and address soft tissue restrictions, analyze and cue movement patterns and analyze and modify body mechanics/ergonomics to attain remaining goals. []  See Plan of Care  []  See progress note/recertification  []  See Discharge Summary         Progress towards goals / Updated goals:    Short Term Goals: To be accomplished in 3 weeks:  1. Patient will subjectively report full compliance with prescribed HEP. Eval: HEP provided  Current: Progressing, HEP performance reported 1x/day, 1/25/2018  2. Patient will demonstrate a (-) R supine SLR in order to improve ease with sleep tolerance. Eval: (+) R Supine SLR  3. Patient will demonstrate a 25% improvement in lumbar extension AROM in order to improve ease with overhead functional lifting. Eval: Lumbar Extension = 90% limited, Reproduction of R lateral radicular pain      Long Term Goals: To be accomplished in 6 weeks:  1. Patient will demonstrate a significant functional improvement as demonstrated by a score of >/=73 on FOTO. Eval: FOTO = 69  2. Patient will demonstrate L/R SLS >/=15 seconds in order to improve ease with ambulation on uneven surfaces. Eval: L SLS <2 sec, R SLS <2 sec  3. Patient will report >/=60% improvement in sleep quality in order to improve overall quality of life.   Eval: 0%, ~4 sleep disturbances/night subjectively reported       PLAN  [x]  Upgrade activities as tolerated     [x]  Continue plan of care  []  Update interventions per flow sheet       []  Discharge due to:_  []  Other:_      Darrell Spain, PT 1/25/2018  7:55 AM    Future Appointments  Date Time Provider Kristine Chasity   1/25/2018 5:30 PM Darrell Spain, PT [facility]   1/29/2018 10:30 AM [facility] [place] 1 [facility]   2/1/2018 4:00 PM Megan Drake, PTA [facility]   2/5/2018 10:30 AM [facility] PT [place] 1 [facility]   2/7/2018 10:30 AM Megan Drake, PTA [facility]   2/12/2018 10:30 AM [facility] PT [place] 1 [facility]   2/14/2018 11:30 AM Megan Drake, PTA [facility]   2/19/2018 10:30 AM [facility] PT [place] 1 [facility]   2/21/2018 11:00 AM Megan Drake, PTA [facility]   2/26/2018 11:00 AM Darrell Spain, PT [facility]   2/28/2018 10:30 AM Megan Drake, PTA [facility]   8/28/2018 9:30 AM Ally Pacheco MD [address] [place] [address]   11/15/2018 8:15 AM Sam Velásquez MD [address] [place] [address]

## 2018-01-29 ENCOUNTER — HOSPITAL ENCOUNTER (OUTPATIENT)
Dept: PHYSICAL THERAPY | Age: 78
Discharge: HOME | End: 2018-01-29
Payer: MEDICARE

## 2018-01-29 PROCEDURE — 97140 MANUAL THERAPY 1/> REGIONS: CPT

## 2018-01-29 NOTE — PROGRESS NOTES
PT DAILY TREATMENT NOTE - [facility]     Patient Name: Froylan Schafer  Date:2018  : 1940  [x]  Patient  Verified  Payor: [place] MEDICARE / Plan: [place] MEDICARE PART A & B / Product Type: Medicare /    In time:10:29  Out time:11:10  Total Treatment Time (min): 41  Total Timed Codes (min): 31  1:1 Treatment Time ( [address] only): 20   Visit #: 3 of 12    Treatment Area: Pain in right hip [M25.551]    SUBJECTIVE  Pain Level (0-10 scale): 10/10  Any medication changes, allergies to medications, adverse drug reactions, diagnosis change, or new procedure performed?: [x] No    [] Yes (see summary sheet for update)  Subjective functional status/changes:   [] No changes reported  Pt reports 10/10 pain today and is not aware of anything that could have irritated his symptoms. OBJECTIVE    Modality rationale: decrease pain and increase tissue extensibility to improve the patients ability to perform ADls without difficulty.    Min Type Additional Details    [] Estim:  []Unatt       []IFC  []Premod                        []Other:  []w/ice   []w/heat  Position:  Location:    [] Estim: []Att    []TENS instruct  []NMES                    []Other:  []w/US   []w/ice   []w/heat  Position:  Location:    []  Traction: [] Cervical       []Lumbar                       [] Prone          []Supine                       []Intermittent   []Continuous Lbs:  [] before manual  [] after manual    []  Ultrasound: []Continuous   [] Pulsed                           []1MHz   []3MHz W/cm2:  Location:    []  Iontophoresis with dexamethasone         Location: [] Take home patch   [] In clinic   10 []  Ice     [x]  heat  []  Ice massage  []  Laser   []  Anodyne Position: supine  Location:Right HS    []  Laser with stim  []  Other:  Position:  Location:    []  Vasopneumatic Device Pressure:       [] lo [] med [] hi   Temperature: [] lo [] med [] hi   [x] Skin assessment post-treatment:  [x]intact [x]redness- no adverse reaction    []redness  adverse reaction:     21 min Therapeutic Exercise:  [] See flow sheet :   Rationale: increase ROM and increase strength to improve the patients ability to perform ADLs without difficulty. 10 min Manual Therapy:  STM to right HS/ITB, TRP release to right piriformis in left S/L. Rationale: decrease pain, increase ROM, increase tissue extensibility and decrease trigger points to perform ADLs without difficulty. With   [] TE   [] TA   [] neuro   [] other: Patient Education: [x] Review HEP    [] Progressed/Changed HEP based on:   [] positioning   [] body mechanics   [] transfers   [] heat/ice application    [] other:      Other Objective/Functional Measures:      Pain Level (0-10 scale) post treatment: 2/10    ASSESSMENT/Changes in Function: Continued with current ex. Per flow sheet. Pt reported increased discomfort with stretches but was able to perform. Pt reported decrease pain after manual and modalities. Patient will continue to benefit from skilled PT services to modify and progress therapeutic interventions, address functional mobility deficits, address ROM deficits, address strength deficits and analyze and address soft tissue restrictions to attain remaining goals. []  See Plan of Care  []  See progress note/recertification  []  See Discharge Summary         Progress towards goals / Updated goals:  Short Term Goals: To be accomplished in 3 weeks:  1. Patient will subjectively report full compliance with prescribed HEP. Eval: HEP provided  Current: Progressing, HEP performance reported 1x/day, 1/25/2018  2. Patient will demonstrate a (-) R supine SLR in order to improve ease with sleep tolerance. Eval: (+) R Supine SLR  3. Patient will demonstrate a 25% improvement in lumbar extension AROM in order to improve ease with overhead functional lifting. Eval: Lumbar Extension = 90% limited, Reproduction of R lateral radicular pain      Long Term Goals: To be accomplished in 6 weeks:  1.  Patient will demonstrate a significant functional improvement as demonstrated by a score of >/=73 on FOTO. Eval: FOTO = 69  2. Patient will demonstrate L/R SLS >/=15 seconds in order to improve ease with ambulation on uneven surfaces. Eval: L SLS <2 sec, R SLS <2 sec  3. Patient will report >/=60% improvement in sleep quality in order to improve overall quality of life.   Eval: 0%, ~4 sleep disturbances/night subjectively reported       PLAN  [x]  Upgrade activities as tolerated     [x]  Continue plan of care  []  Update interventions per flow sheet       []  Discharge due to:_  []  Other:_      Remington Baxter PTA 1/29/2018  11:06 AM    Future Appointments  Date Time Provider Kristine Gaspar   2/1/2018 4:00 PM Gaye Resendiz PTA [facility]   2/5/2018 10:30 AM [facility] PT [place] [facility]   2/7/2018 10:30 AM Gaye Resendiz PTA [facility] [place]   2/12/2018 10:30 AM [facility] PT [place] [facility] [place]   2/14/2018 11:30 AM Gaye Resendiz PTA [facility] [place]   2/19/2018 10:30 AM [facility] PT [place] [facility] [place]   2/21/2018 11:00 AM Gaye Resendiz PTA [facility] [place]   2/26/2018 11:00 AM Jose Cruz Perez PT [facility] [place]   2/28/2018 10:30 AM Gaye Resendiz PTA [facility] [place]   8/28/2018 9:30 AM Mary Lou Knox MD [address] [place] [address]   11/15/2018 8:15 AM Jose De Jesus Lake MD [address] [place] [address]

## 2018-02-01 ENCOUNTER — HOSPITAL ENCOUNTER (OUTPATIENT)
Dept: PHYSICAL THERAPY | Age: 78
Discharge: HOME | End: 2018-02-01
Payer: MEDICARE

## 2018-02-01 PROCEDURE — 97110 THERAPEUTIC EXERCISES: CPT

## 2018-02-01 PROCEDURE — 97140 MANUAL THERAPY 1/> REGIONS: CPT

## 2018-02-01 NOTE — PROGRESS NOTES
PT DAILY TREATMENT NOTE - [facility]     Patient Name: Alan Frias  Date:2018  : 1940  [x]  Patient  Verified  Payor: VA MEDICARE / Plan: VA MEDICARE PART A & B / Product Type: Medicare /    In time:3:53  Out time:4:45  Total Treatment Time (min): 52  Total Timed Codes (min): 42  1:1 Treatment Time ( [address] only): 30   Visit #: 4 of 12    Treatment Area: Pain in right hip [M25.551]    SUBJECTIVE  Pain Level (0-10 scale): 0  Any medication changes, allergies to medications, adverse drug reactions, diagnosis change, or new procedure performed?: [x] No    [] Yes (see summary sheet for update)  Subjective functional status/changes:   [] No changes reported  Pt reports that he has pain that runs down the side of his hip that comes and goes. OBJECTIVE    Modality rationale: decrease pain to improve the patients ability to decrease difficulty while performing tasks.     Min Type Additional Details    [] Estim:  []Unatt       []IFC  []Premod                        []Other:  []w/ice   []w/heaty  Position:  Location:    [] Estim: []Att    []TENS instruct  []NMES                    []Other:  []w/US   []w/ice   []w/heat  Position:  Location:    []  Traction: [] Cervical       []Lumbar                       [] Prone          []Supine                       []Intermittent   []Continuous Lbs:  [] before manual  [] after manual    []  Ultrasound: []Continuous   [] Pulsed                           []1MHz   []3MHz W/cm2:  Location:    []  Iontophoresis with dexamethasone         Location: [] Take home patch   [] In clinic   10 []  Ice     [x]  heat  []  Ice massage  []  Laser   []  Anodyne Position: L sidelying  Location:R hip    []  Laser with stim  []  Other:  Position:  Location:    []  Vasopneumatic Device Pressure:       [] lo [] med [] hi   Temperature: [] lo [] med [] hi   [] Skin assessment post-treatment:  []intact []redness- no adverse reaction    []redness  adverse reaction:       34 min Therapeutic Exercise:  [x] See flow sheet :   Rationale: increase ROM and increase strength to improve the patients ability to increase tolerance to activities. 8 min Manual Therapy:  Long axial distraction, inferior and lateral hip mobs,    Rationale: decrease pain, increase ROM, increase tissue extensibility and decrease trigger points to increase ease with ADLs. With   [] TE   [] TA   [] neuro   [] other: Patient Education: [x] Review HEP    [] Progressed/Changed HEP based on:   [] positioning   [] body mechanics   [] transfers   [] heat/ice application    [] other:      Other Objective/Functional Measures: (+) R  Supine SLR. Pain Level (0-10 scale) post treatment:1    ASSESSMENT/Changes in Function: Pt requires frequent VC and tactile cues to perform posterior pelvic tilts. Patient will continue to benefit from skilled PT services to modify and progress therapeutic interventions, address functional mobility deficits, address ROM deficits, address strength deficits and analyze and address soft tissue restrictions to attain remaining goals. []  See Plan of Care  []  See progress note/recertification  []  See Discharge Summary         Progress towards goals / Updated goals:  Short Term Goals: To be accomplished in 3 weeks:  1. Patient will subjectively report full compliance with prescribed HEP. Eval: HEP provided  Current: Progressing, HEP performance reported 1x/day, 1/25/2018  2. Patient will demonstrate a (-) R supine SLR in order to improve ease with sleep tolerance. Eval: (+) R Supine SLR  Current: Remains (+) R  Supine SLR. 2/1/18   3. Patient will demonstrate a 25% improvement in lumbar extension AROM in order to improve ease with overhead functional lifting. Eval: Lumbar Extension = 90% limited, Reproduction of R lateral radicular pain      Long Term Goals: To be accomplished in 6 weeks:  1.  Patient will demonstrate a significant functional improvement as demonstrated by a score of >/=73 on FOTO.  Eval: FOTO = 69  2. Patient will demonstrate L/R SLS >/=15 seconds in order to improve ease with ambulation on uneven surfaces. Eval: L SLS <2 sec, R SLS <2 sec  3. Patient will report >/=60% improvement in sleep quality in order to improve overall quality of life.   Eval: 0%, ~4 sleep disturbances/night subjectively reported    PLAN  []  Upgrade activities as tolerated     [x]  Continue plan of care  []  Update interventions per flow sheet       []  Discharge due to:_  []  Other:_      Adela Mesa PTA 2/1/2018  3:50 PM    Future Appointments  Date Time Provider Kristine Chasity   2/1/2018 4:00 PM Adela Mesa PTA [facility]   2/5/2018 10:30 AM [facility] PT [place] 1 [facility]   2/7/2018 10:30 AM Adela Mesa PTA [facility]   2/12/2018 10:30 AM [facility] PT [place] 1 [facility]   2/14/2018 11:30 AM Adela Mesa PTA [facility]   2/19/2018 10:30 AM [facility] PT [place] 1 [facility]   2/21/2018 11:00 AM Adela Mesa PTA [facility]   2/26/2018 11:00 AM Liss Santiago, PT [facility]   2/28/2018 10:30 AM Adela Mesa PTA [facility]   8/28/2018 9:30 AM Albert Godoy MD [address] [facility]   11/15/2018 8:15 AM Simi Seo MD [address] [place] [address]

## 2018-02-05 ENCOUNTER — HOSPITAL ENCOUNTER (OUTPATIENT)
Dept: PHYSICAL THERAPY | Age: 78
Discharge: HOME | End: 2018-02-05
Payer: MEDICARE

## 2018-02-05 PROCEDURE — 97140 MANUAL THERAPY 1/> REGIONS: CPT

## 2018-02-05 PROCEDURE — 97110 THERAPEUTIC EXERCISES: CPT

## 2018-02-05 NOTE — PROGRESS NOTES
PT DAILY TREATMENT NOTE - [facility]     Patient Name: Daphnie Ghosh  Date:2018  : 1940  [x]  Patient  Verified  Payor: VA MEDICARE / Plan: VA MEDICARE PART A & B / Product Type: Medicare /    In time:10:25  Out time:11:05  Total Treatment Time (min): 40  Total Timed Codes (min): 30  1:1 Treatment Time ( [address] only): 30   Visit #: 5 of 12    Treatment Area: Pain in right hip [M25.551]    SUBJECTIVE  Pain Level (0-10 scale): 5-6/10  Any medication changes, allergies to medications, adverse drug reactions, diagnosis change, or new procedure performed?: [x] No    [] Yes (see summary sheet for update)  Subjective functional status/changes:   [] No changes reported  \"The pain just comes and goes, sitting is better . \"    OBJECTIVE    Modality rationale: decrease pain and increase tissue extensibility to improve the patients ability to perform ADls without difficulty.    Min Type Additional Details    [] Estim:  []Unatt       []IFC  []Premod                        []Other:  []w/ice   []w/heat  Position:  Location:    [] Estim: []Att    []TENS instruct  []NMES                    []Other:  []w/US   []w/ice   []w/heat  Position:  Location:    []  Traction: [] Cervical       []Lumbar                       [] Prone          []Supine                       []Intermittent   []Continuous Lbs:  [] before manual  [] after manual    []  Ultrasound: []Continuous   [] Pulsed                           []1MHz   []3MHz W/cm2:  Location:    []  Iontophoresis with dexamethasone         Location: [] Take home patch   [] In clinic   10 []  Ice     [x]  heat  []  Ice massage  []  Laser   []  Anodyne Position: left S/L  Location:right ITB,HS    []  Laser with stim  []  Other:  Position:  Location:    []  Vasopneumatic Device Pressure:       [] lo [] med [] hi   Temperature: [] lo [] med [] hi   [x] Skin assessment post-treatment:  [x]intact [x]redness- no adverse reaction    []redness  adverse reaction:     22 min Therapeutic Exercise:  [] See flow sheet :   Rationale: increase ROM and increase strength to improve the patients ability to perform ADLs without difficulty. 8 min Manual Therapy:  STM to right ITB and piriformis in left S/L position. Rationale: decrease pain, increase ROM, increase tissue extensibility and decrease trigger points to perform ADls without difficulty. With   [] TE   [] TA   [] neuro   [] other: Patient Education: [x] Review HEP    [] Progressed/Changed HEP based on:   [] positioning   [] body mechanics   [] transfers   [] heat/ice application    [] other:      Other Objective/Functional Measures:      Pain Level (0-10 scale) post treatment: 0/10    ASSESSMENT/Changes in Function: Continued with current ex. Per flow sheet. Pt reported some discomfort with ex. But was able to perform all ex. Figure 4 stretch. No p! Was reported after therapy today. Patient will continue to benefit from skilled PT services to modify and progress therapeutic interventions, address functional mobility deficits, address ROM deficits, address strength deficits, analyze and address soft tissue restrictions and analyze and cue movement patterns to attain remaining goals. []  See Plan of Care  []  See progress note/recertification  []  See Discharge Summary         Progress towards goals / Updated goals:  Short Term Goals: To be accomplished in 3 weeks:  1. Patient will subjectively report full compliance with prescribed HEP. Eval: HEP provided  Current: Progressing, HEP performance reported 1x/day, 1/25/2018  2. Patient will demonstrate a (-) R supine SLR in order to improve ease with sleep tolerance. Eval: (+) R Supine SLR  Current: Remains (+) R  Supine SLR. 2/1/18   3. Patient will demonstrate a 25% improvement in lumbar extension AROM in order to improve ease with overhead functional lifting.   Eval: Lumbar Extension = 90% limited, Reproduction of R lateral radicular pain      Long Term Goals: To be accomplished in 6 weeks:  1. Patient will demonstrate a significant functional improvement as demonstrated by a score of >/=73 on FOTO. Eval: FOTO = 69  2. Patient will demonstrate L/R SLS >/=15 seconds in order to improve ease with ambulation on uneven surfaces. Eval: L SLS <2 sec, R SLS <2 sec  Current: progressing. SLS 10 sec, R 8 sec without LOB. 2/5/18  3. Patient will report >/=60% improvement in sleep quality in order to improve overall quality of life. Eval: 0%, ~4 sleep disturbances/night subjectively reported  Current\" Progressing. Pt reports waking up 2 times a night.  2/5/18  PLAN  [x]  Upgrade activities as tolerated     [x]  Continue plan of care  []  Update interventions per flow sheet       []  Discharge due to:_  []  Other:_      Laura Marr PTA 2/5/2018  10:30 AM    Future Appointments  Date Time Provider Kristine Gaspar   2/7/2018 10:30 AM Bette Oliveros PTA [facility]   2/12/2018 10:30 AM [facility] PT [place] [address] [facility]   2/14/2018 11:30 AM Bette Oliveros PTA [facility]   2/19/2018 10:30 AM [facility] PT [place] [address] [facility]   2/21/2018 11:00 AM Bette Oliveros PTA [facility]   2/26/2018 11:00 AM Charlee James PT [facility]   2/28/2018 10:30 AM Bette Oliveros PTA [facility]   8/28/2018 9:30 AM Jeison Phelps MD [address] [facility]   11/15/2018 8:15 AM Nigel Molina MD [address] [place] [address]

## 2018-02-07 ENCOUNTER — HOSPITAL ENCOUNTER (OUTPATIENT)
Dept: PHYSICAL THERAPY | Age: 78
Discharge: HOME | End: 2018-02-07
Payer: MEDICARE

## 2018-02-07 PROCEDURE — 97140 MANUAL THERAPY 1/> REGIONS: CPT

## 2018-02-07 PROCEDURE — 97110 THERAPEUTIC EXERCISES: CPT

## 2018-02-07 NOTE — PROGRESS NOTES
PT DAILY TREATMENT NOTE - [facility]     Patient Name: Harvinder Krueger  Date:2018  : 1940  [x]  Patient  Verified  Payor: VA MEDICARE / Plan: VA MEDICARE PART A & B / Product Type: Medicare /    In time:10:22  Out time:11:06  Total Treatment Time (min): 44  Total Timed Codes (min): 34  1:1 Treatment Time ( [address] only): 34   Visit #: 6 of 12    Treatment Area: Pain in right hip [M25.551]    SUBJECTIVE  Pain Level (0-10 scale): 10  Any medication changes, allergies to medications, adverse drug reactions, diagnosis change, or new procedure performed?: [x] No    [] Yes (see summary sheet for update)  Subjective functional status/changes:   [] No changes reported  Pt reports he had a bad night last night and he has been in a lot of pain this morning. OBJECTIVE    Modality rationale: decrease pain to improve the patients ability to decrease difficulty while performing tasks.     Min Type Additional Details    [] Estim:  []Unatt       []IFC  []Premod                        []Other:  []w/ice   []w/heat  Position:  Location:    [] Estim: []Att    []TENS instruct  []NMES                    []Other:  []w/US   []w/ice   []w/heat  Position:  Location:    []  Traction: [] Cervical       []Lumbar                       [] Prone          []Supine                       []Intermittent   []Continuous Lbs:  [] before manual  [] after manual    []  Ultrasound: []Continuous   [] Pulsed                           []1MHz   []3MHz W/cm2:  Location:    []  Iontophoresis with dexamethasone         Location: [] Take home patch   [] In clinic   10 []  Ice     [x]  heat  []  Ice massage  []  Laser   []  Anodyne Position:sitting  Location:back     []  Laser with stim  []  Other:  Position:  Location:    []  Vasopneumatic Device Pressure:       [] lo [] med [] hi   Temperature: [] lo [] med [] hi   [] Skin assessment post-treatment:  []intact []redness- no adverse reaction    []redness  adverse reaction:          26 min Therapeutic Exercise:  [x] See flow sheet :   Rationale: increase ROM and increase strength to improve the patients ability to increase tolerance to activities.         8 min Manual Therapy:  Long axial distraction, inferior and lateral hip mobs, STM to hamstring and ITband, hamstring stretch. Rationale: decrease pain, increase ROM, increase tissue extensibility and decrease trigger points to increase ease with ADLs. With   [] TE   [] TA   [] neuro   [] other: Patient Education: [x] Review HEP    [] Progressed/Changed HEP based on:   [] positioning   [] body mechanics   [] transfers   [] heat/ice application    [] other:      Other Objective/Functional Measures: FOTO = 48, regressed by 21 since [facility]. Pain Level (0-10 scale) post treatment: 0    ASSESSMENT/Changes in Function: Pt has difficulty performing SLS on R LE and R dynamic hamstring stretch due to pain going down the right LE. Patient will continue to benefit from skilled PT services to modify and progress therapeutic interventions, address functional mobility deficits, address ROM deficits, address strength deficits and analyze and address soft tissue restrictions to attain remaining goals. []  See Plan of Care  []  See progress note/recertification  []  See Discharge Summary         Progress towards goals / Updated goals:  Short Term Goals: To be accomplished in 3 weeks:  1. Patient will subjectively report full compliance with prescribed HEP. Eval: HEP provided  Current: Progressing, HEP performance reported 1x/day, 1/25/2018  2. Patient will demonstrate a (-) R supine SLR in order to improve ease with sleep tolerance. Eval: (+) R Supine SLR  Current: Remains (+) R  Supine SLR. 2/1/18   3. Patient will demonstrate a 25% improvement in lumbar extension AROM in order to improve ease with overhead functional lifting.   Eval: Lumbar Extension = 90% limited, Reproduction of R lateral radicular pain      Long Term Goals: To be accomplished in 6 weeks:  1. Patient will demonstrate a significant functional improvement as demonstrated by a score of >/=73 on FOTO. Eval: FOTO = 69   Current; Regressed: FOTO = 48, regressed by 21 since [facility]. 2/7/18  2. Patient will demonstrate L/R SLS >/=15 seconds in order to improve ease with ambulation on uneven surfaces. Eval: L SLS <2 sec, R SLS <2 sec  Current: progressing. SLS 10 sec, R 8 sec without LOB. 2/5/18  3. Patient will report >/=60% improvement in sleep quality in order to improve overall quality of life. Eval: 0%, ~4 sleep disturbances/night subjectively reported  Current\" Progressing. Pt reports waking up 2 times a night.  2/5/18    PLAN  []  Upgrade activities as tolerated     [x]  Continue plan of care  []  Update interventions per flow sheet       []  Discharge due to:_  []  Other:_      Jarad Gomez PTA 2/7/2018  10:23 AM    Future Appointments  Date Time Provider Kristine Gaspar   2/7/2018 10:30 AM Jarad Gomez PTA [facility]   2/12/2018 10:30 AM [facility] PT [place] [facility]   2/14/2018 11:30 AM Jarad Gomez PTA [facility]   2/19/2018 10:30 AM [facility] PT [place] [facility]   2/21/2018 11:00 AM Jarad Gomez PTA [facility]   2/26/2018 11:00 AM Rocky Barron PT [facility]   2/28/2018 10:30 AM Jarad Gomez PTA [facility]   8/28/2018 9:30 AM Zenobia Arora MD [facility]   11/15/2018 8:15 AM Negro Hutchins MD [address] [place] [address]

## 2018-02-09 RX ORDER — HYDROCHLOROTHIAZIDE 12.5 MG/1
TABLET ORAL
Qty: 30 TAB | Refills: 6 | Status: SHIPPED | OUTPATIENT
Start: 2018-02-09 | End: 2018-09-25 | Stop reason: SDUPTHER

## 2018-02-12 ENCOUNTER — HOSPITAL ENCOUNTER (OUTPATIENT)
Dept: PHYSICAL THERAPY | Age: 78
Discharge: HOME | End: 2018-02-12
Payer: MEDICARE

## 2018-02-12 PROCEDURE — 97110 THERAPEUTIC EXERCISES: CPT

## 2018-02-12 NOTE — PROGRESS NOTES
PT DAILY TREATMENT NOTE - [facility]     Patient Name: Radha Worrell  Date:2018  : 1940  [x]  Patient  Verified  Payor: Pao Jeronimows / Plan: [place] MED[place]ARE PART A & B / Product Type: Medicare /    In time:10:30  Out time:10:50  Total Treatment Time (min): 20  Total Timed Codes (min): 10  1:1 Treatment Time ( [address] only): 10   Visit #: 7 of 12    Treatment Area: Pain in right hip [M25.551]    SUBJECTIVE  Pain Level (0-10 scale): 10/10  Any medication changes, allergies to medications, adverse drug reactions, diagnosis change, or new procedure performed?: [x] No    [] Yes (see summary sheet for update)  Subjective functional status/changes:   [] No changes reported  \"It's a 10+/10 today. \"    OBJECTIVE    Modality rationale: decrease pain and increase tissue extensibility to improve the patients ability to perform ADls without difficulty.    Min Type Additional Details    [] Estim:  []Unatt       []IFC  []Premod                        []Other:  []w/ice   []w/heat  Position:  Location:    [] Estim: []Att    []TENS instruct  []NMES                    []Other:  []w/US   []w/ice   []w/heat  Position:  Location:    []  Traction: [] Cervical       []Lumbar                       [] Prone          []Supine                       []Intermittent   []Continuous Lbs:  [] before manual  [] after manual    []  Ultrasound: []Continuous   [] Pulsed                           []1MHz   []3MHz W/cm2:  Location:    []  Iontophoresis with dexamethasone         Location: [] Take home patch   [] In clinic   10 []  Ice     [x]  heat  []  Ice massage  []  Laser   []  Anodyne Position:left S/L  Location:right ITB/HS    []  Laser with stim  []  Other:  Position:  Location:    []  Vasopneumatic Device Pressure:       [] lo [] med [] hi   Temperature: [] lo [] med [] hi   [x] Skin assessment post-treatment:  [x]intact [x]redness- no adverse reaction    []redness  adverse reaction:     10 min Therapeutic Exercise:  [] See flow sheet : Rationale: increase ROM and increase strength to improve the patients ability to perform ADls without difficulty. With   [] TE   [] TA   [] neuro   [] other: Patient Education: [x] Review HEP    [] Progressed/Changed HEP based on:   [] positioning   [] body mechanics   [] transfers   [] heat/ice application    [] other:      Other Objective/Functional Measures:      Pain Level (0-10 scale) post treatment: 9/10    ASSESSMENT/Changes in Function: Pt was unable to perform ex. Due to increased right HS/ITB pain. Pt tried to perform S.B 3 way stretch but reported cramping in his left arm and was able to perform. Advised pt. To contact MD to address worsening symptoms. Pt was unable to tolerate light touch manual today. Patient will continue to benefit from skilled PT services to modify and progress therapeutic interventions, address functional mobility deficits, address ROM deficits, address strength deficits, analyze and address soft tissue restrictions and analyze and cue movement patterns to attain remaining goals. []  See Plan of Care  []  See progress note/recertification  []  See Discharge Summary         Progress towards goals / Updated goals:    Short Term Goals: To be accomplished in 3 weeks:  1. Patient will subjectively report full compliance with prescribed HEP. Eval: HEP provided  Current: Progressing, HEP performance reported 1x/day, 1/25/2018  2. Patient will demonstrate a (-) R supine SLR in order to improve ease with sleep tolerance. Eval: (+) R Supine SLR  Current: Remains (+) R  Supine SLR. 2/1/18   3. Patient will demonstrate a 25% improvement in lumbar extension AROM in order to improve ease with overhead functional lifting. Eval: Lumbar Extension = 90% limited, Reproduction of R lateral radicular pain      Long Term Goals: To be accomplished in 6 weeks:  1. Patient will demonstrate a significant functional improvement as demonstrated by a score of >/=73 on FOTO.   Eval: FOTO = 69   Current; Regressed: FOTO = 48, regressed by 21 since [facility]. 2/7/18  2. Patient will demonstrate L/R SLS >/=15 seconds in order to improve ease with ambulation on uneven surfaces. Eval: L SLS <2 sec, R SLS <2 sec  Current: progressing. SLS 10 sec, R 8 sec without LOB. 2/5/18  3. Patient will report >/=60% improvement in sleep quality in order to improve overall quality of life. Eval: 0%, ~4 sleep disturbances/night subjectively reported  Current\" Progressing. Pt reports waking up 2 times a night.  2/5/18  PLAN  [x]  Upgrade activities as tolerated     [x]  Continue plan of care  []  Update interventions per flow sheet       []  Discharge due to:_  []  Other:_      Kiran Yi PTA 2/12/2018  10:31 AM    Future Appointments  Date Time Provider Kristine Gaspar   2/14/2018 11:30 AM Colton Ohara PTA [facility]   2/19/2018 10:30 AM [facility] PT [place] [facility]   2/21/2018 11:00 AM Colton Ohara PTA [facility]   2/26/2018 11:00 AM Reina Alarcon PT [facility]   2/28/2018 10:30 AM Colton Ohara PTA [facility]   8/28/2018 9:30 AM Dakota Polanco MD [address] [facility]   11/15/2018 8:15 AM Claribel De Leon  [facility]

## 2018-02-14 ENCOUNTER — HOSPITAL ENCOUNTER (OUTPATIENT)
Dept: PHYSICAL THERAPY | Age: 78
Discharge: HOME | End: 2018-02-14
Payer: MEDICARE

## 2018-02-14 PROCEDURE — G8980 MOBILITY D/C STATUS: HCPCS

## 2018-02-14 PROCEDURE — 97012 MECHANICAL TRACTION THERAPY: CPT

## 2018-02-14 PROCEDURE — G8979 MOBILITY GOAL STATUS: HCPCS

## 2018-02-14 PROCEDURE — 97164 PT RE-EVAL EST PLAN CARE: CPT

## 2018-02-14 PROCEDURE — G8978 MOBILITY CURRENT STATUS: HCPCS

## 2018-02-14 NOTE — PROGRESS NOTES
[facility]              [address]        [place], [address]   [phone] fax    Medicare Progress Report    Patient name: Toney Vaughn Start of Care: 2018   Referral source: Mandy Kiser : 1940   Medical/Treatment Diagnosis: Pain in right hip [M25.551] Onset Date:2 months prior to IE     Prior Hospitalization: see medical history Provider#: [id number]   Medications: Verified on Patient Summary List     Comorbidities: Arthritis, BMI>30, Prostate Cancer (remission)   Prior Level of Function: No previous history of low back pain/hip pain, No history of sleep disturbances, No limitations in ambulation tolerance, Quilting, Retired  Visits from [place] of Care: 8    Missed Visits: 0    Reporting Period: 2018 to 2018    Subjective Reports:     Patient reports worsening of symptoms 2018 with patient reporting secondarily a significant increase in R LE symptoms with pain reported to radiate along the R lateral thigh and lower leg distally to the foot. Patient reports secondary to symptoms overall instability of the R LE with decreased balance with ambulation with patient reporting decreased weightbearing tolerance. Patient denies falls. Secondary to sudden onset of R LE symptoms patient reports receival of R LE doppler ultrasound 2018 with testing normal.     Short Term Goals: To be accomplished in 3 weeks:  1. Patient will subjectively report full compliance with prescribed HEP. Eval: HEP provided  At PN: Progressing, HEP performance reported 1x/day  2. Patient will demonstrate a (-) R supine SLR in order to improve ease with sleep tolerance. Eval: (+) R Supine SLR  At PN: Remains (+) R  Supine SLR. 3. Patient will demonstrate a 25% improvement in lumbar extension AROM in order to improve ease with overhead functional lifting.   Eval: Lumbar Extension = 90% limited, Reproduction of R lateral radicular pain  At PN: Inability to reassess due to high symptom irritability.      Long Term Goals: To be accomplished in 6 weeks:  1. Patient will demonstrate a significant functional improvement as demonstrated by a score of >/=73 on FOTO. Eval: FOTO = 69   At PN: Regressed: FOTO = 48, regressed by 21 since [facility]  2. Patient will demonstrate L/R SLS >/=15 seconds in order to improve ease with ambulation on uneven surfaces. Eval: L SLS <2 sec, R SLS <2 sec  At PN: progressing. SLS 10 sec, R 8 sec without LOB. 3. Patient will report >/=60% improvement in sleep quality in order to improve overall quality of life. Eval: 0%, ~4 sleep disturbances/night subjectively reported  At PN: Progressing. Pt reports waking up 2 times a night.      Key functional changes:     Gait: R antalgic gait pattern with decreased stance phase time through R LE  Special Testing: (+) R SLR (<30 deg), (+) R Hip JAILENE, (+) R Hip FADDIR  Joint Mobility Assessment: Pain with R hip lateral, long-axis inferior and AP mobilization                 **Due to high symptom irritability and patient presentation with patient demonstrating at initiation of session unstable gait thus patient provided with quad cane. Due to patient presentation unable to assess in standing. At cessation of treatment patient presented with a significant improvement in gait quality and safety with patient subjectively reporting \"I am a 0/10 now, I feel like I could run out of here now\". Problems/ barriers to goal attainment: Sudden change in symptom presentation. Assessment / Recommendations:    Patient to be referred back to MD at this time due to sudden change in patient status with onset of change reported 2/5/2018. Since this time patient has presented with a significant deterioration of gait mechanics, overall increase in symptom irritability and severity and reduction in activity tolerance.  Reassessed patient at this visit due to change in status with patient noted with overall poor tolerance to attempted reassessment therefore minimal reliable information achieved. Patient did demonstrate a (+) supine SLR therefore trialed lumbar traction with requirement to cease prior to completion due to intolerance. Patient presentation has been inconsistent with patient today presenting at initiation of treatment with R antalgic gait pattern and decreased stance phase through the R LE due to pain with patient requiring assistance from quad cane due to instability. At cessation of treatment, with only supine traction x8 minutes and moist heat (L sidelying) patient noted with significant improvement in gait symmetry with patient reporting pain reduced from 10/10 to 0/10 with patient subjectively reporting \"I feel like I could run out of here now\". Due to sudden change in status and poor ability to assess patient objectively and subsequently treat patient to be referred back to MD with further PT services to be placed on hold until MD follow up. Problem List: pain affecting function, decrease ROM, decrease strength, impaired gait/ balance, decrease ADL/ functional abilitiies, decrease activity tolerance and decrease flexibility/ joint mobility   Treatment Plan: Therapeutic exercise, Therapeutic activities, Neuromuscular re-education, Physical agent/modality, Gait/balance training, Manual therapy, Patient education, Self Care training, Functional mobility training, Home safety training and Stair training    Updated Goals: Continue with unmet goals above. Frequency / Duration: Do be determined upon MD follow up    G-Codes (GP)  Mobility  [id number] Current  CK= 40-59%   Goal  CJ= 20-39%    The severity rating is based on clinical judgment and the FOTO score.       Kamilah Ghosh, PT 2/14/2018 1:06 PM

## 2018-02-14 NOTE — PROGRESS NOTES
PT DAILY TREATMENT NOTE - [facility]     Patient Name: Kasia Kingsley  Date:2018  : 1940  [x]  Patient  Verified  Payor: Don Gottron / Plan: [place] MEDICARE PART A & B / Product Type: Medicare /    In time:1125  Out time:1206  Total Treatment Time (min): 31  Total Timed Codes (min): 13  1:1 Treatment Time ( [address] only): 13   Visit #: 8 of 12    Treatment Area: Pain in right hip [M25.551]    SUBJECTIVE  Pain Level (0-10 scale): 10  Any medication changes, allergies to medications, adverse drug reactions, diagnosis change, or new procedure performed?: [x] No    [] Yes (see summary sheet for update)  Subjective functional status/changes:   [] No changes reported  Patient reports worsening of symptoms 2018 with patient reporting secondarily a significant increase in R LE symptoms with pain reported to radiate along the R lateral thigh and lower leg distally to the foot. Patient reports secondary to symptoms overall instability of the R LE with decreased balance with ambulation with patient reporting decreased weightbearing tolerance. Patient denies falls.  Secondary to sudden onset of R LE symptoms patient reports receival of R LE doppler ultrasound 2018 with testing normal.     OBJECTIVE    Modality rationale: decrease inflammation and decrease pain to improve the patients ability to improve ease with sleep   Min Type Additional Details   8 [x]  Traction: [] Cervical       [x]Lumbar                       [] Prone          []Supine                       [x]Intermittent   []Continuous Lbs: 90 lb max / 45 lb min   10 []  Ice     [x]  heat  []  Ice massage Position:  Location:     13 min -Eval                  X Re-Eval           With   [] TE   [] TA   [] neuro   [] other: Patient Education: [x] Review HEP    [] Progressed/Changed HEP based on:   [] positioning   [] body mechanics   [] transfers   [] heat/ice application    [] other:      Other Objective/Functional Measures:     Gait: R antalgic gait pattern with decreased stance phase time through R LE  Special Testing: (+) R SLR (<30 deg), (+) R Hip JAILENE, (+) R Hip FADDIR  Joint Mobility Assessment: Pain with R hip lateral, long-axis inferior and AP mobilization     **Due to high symptom irritability and patient presentation with patient demonstrating at initiation of session unstable gait thus patient provided with quad cane. Due to patient presentation unable to assess in standing. At cessation of treatment patient presented with a significant improvement in gait quality and safety with patient subjectively reporting \"I am a 0/10 now, I feel like I could run out of here now\". Pain Level (0-10 scale) post treatment: 0    ASSESSMENT/Changes in Function: Patient to be referred back to MD at this time due to sudden change in patient status with onset of change reported 2/5/2018. Since this time patient has presented with a significant deterioration of gait mechanics, overall increase in symptom irritability and severity and reduction in activity tolerance. Reassessed patient at this visit due to change in status with patient noted with overall poor tolerance to attempted reassessment therefore minimal reliable information achieved. Patient did demonstrate a (+) supine SLR therefore trialed lumbar traction with requirement to cease prior to completion due to intolerance. Patient presentation has been inconsistent with patient today presenting at initiation of treatment with R antalgic gait pattern and decreased stance phase through the R LE due to pain with patient requiring assistance from quad cane due to instability. At cessation of treatment, with only supine traction x8 minutes and moist heat (L sidelying) patient noted with significant improvement in gait symmetry with patient reporting pain reduced from 10/10 to 0/10 with patient subjectively reporting \"I feel like I could run out of here now\".  Due to sudden change in status and poor ability to assess patient objectively and subsequently treat patient to be referred back to MD with further PT services to be placed on hold until MD follow up. Patient will continue to benefit from skilled PT services to modify and progress therapeutic interventions, address functional mobility deficits, address ROM deficits, address strength deficits, analyze and address soft tissue restrictions, analyze and cue movement patterns and analyze and modify body mechanics/ergonomics to attain remaining goals. []  See Plan of Care  [x]  See progress note/recertification  []  See Discharge Summary         Progress towards goals / Updated goals:    Short Term Goals: To be accomplished in 3 weeks:  1. Patient will subjectively report full compliance with prescribed HEP. Eval: HEP provided  Current: Progressing, HEP performance reported 1x/day, 1/25/2018  2. Patient will demonstrate a (-) R supine SLR in order to improve ease with sleep tolerance. Eval: (+) R Supine SLR  Current: Remains (+) R  Supine SLR. 2/1/18   3. Patient will demonstrate a 25% improvement in lumbar extension AROM in order to improve ease with overhead functional lifting. Eval: Lumbar Extension = 90% limited, Reproduction of R lateral radicular pain  Current: Inability to reassess due to high symptom irritability.      Long Term Goals: To be accomplished in 6 weeks:  1. Patient will demonstrate a significant functional improvement as demonstrated by a score of >/=73 on FOTO. Eval: FOTO = 69   Current; Regressed: FOTO = 48, regressed by 21 since [facility]. 2/7/18  2. Patient will demonstrate L/R SLS >/=15 seconds in order to improve ease with ambulation on uneven surfaces. Eval: L SLS <2 sec, R SLS <2 sec  Current: progressing. SLS 10 sec, R 8 sec without LOB. 2/5/18  3. Patient will report >/=60% improvement in sleep quality in order to improve overall quality of life. Eval: 0%, ~4 sleep disturbances/night subjectively reported  Current\" Progressing.  Pt reports waking up 2 times a night.  2/5/18    PLAN  [x]  Upgrade activities as tolerated     [x]  Continue plan of care  []  Update interventions per flow sheet       []  Discharge due to:_  []  Other:_      Ugo Flynn, PT 2/14/2018  12:34 PM    Future Appointments  Date Time Provider Kristine Gaspar   2/19/2018 10:30 AM [facility] PT [place] [facility]   2/21/2018 11:00 AM Damien Espinoza PTA [facility]   2/26/2018 11:00 AM Ugo Flynn PT [facility]   2/28/2018 10:30 AM Damien Espinoza PTA [facility]   8/28/2018 9:30 [facility]   11/15/2018 8:15 AM Eula Soler MD [address] [place] [address]

## 2018-02-15 DIAGNOSIS — I10 HYPERTENSION, ESSENTIAL: ICD-10-CM

## 2018-02-15 RX ORDER — AMLODIPINE BESYLATE 5 MG/1
TABLET ORAL
Qty: 90 TAB | Refills: 3 | Status: SHIPPED | OUTPATIENT
Start: 2018-02-15 | End: 2019-01-21 | Stop reason: SDUPTHER

## 2018-02-19 ENCOUNTER — APPOINTMENT (OUTPATIENT)
Dept: PHYSICAL THERAPY | Age: 78
End: 2018-02-19
Payer: MEDICARE

## 2018-02-21 ENCOUNTER — APPOINTMENT (OUTPATIENT)
Dept: PHYSICAL THERAPY | Age: 78
End: 2018-02-21
Payer: MEDICARE

## 2018-02-26 ENCOUNTER — APPOINTMENT (OUTPATIENT)
Dept: PHYSICAL THERAPY | Age: 78
End: 2018-02-26
Payer: MEDICARE

## 2018-02-28 ENCOUNTER — APPOINTMENT (OUTPATIENT)
Dept: PHYSICAL THERAPY | Age: 78
End: 2018-02-28
Payer: MEDICARE

## 2018-03-26 DIAGNOSIS — I10 HYPERTENSION, ESSENTIAL: ICD-10-CM

## 2018-03-26 DIAGNOSIS — I10 ESSENTIAL HYPERTENSION, BENIGN: Primary | ICD-10-CM

## 2018-03-26 RX ORDER — LISINOPRIL 5 MG/1
5 TABLET ORAL DAILY
Qty: 30 TAB | Refills: 6 | Status: SHIPPED | OUTPATIENT
Start: 2018-03-26 | End: 2018-10-26 | Stop reason: SDUPTHER

## 2018-03-26 NOTE — TELEPHONE ENCOUNTER
Requested Prescriptions     Pending Prescriptions Disp Refills    lisinopril (PRINIVIL, ZESTRIL) 5 mg tablet 30 Tab 6

## 2018-04-03 NOTE — PROGRESS NOTES
[facility]              [address]        [place], [address] [place]   [phone] fax      Discharge Summary  Patient name: Lisy Jessica Start of Care: 2018   Referral source: Kimo Au : 1940   Medical/Treatment Diagnosis: Pain in right hip [M25.551] Onset Date:2 months prior to IE     Prior Hospitalization: see medical history Provider#: [id number]   Medications: Verified on Patient Summary List    Comorbidities: Arthritis, BMI>30, Prostate Cancer (remission)   Prior Level of Function: No previous history of low back pain/hip pain, No history of sleep disturbances, No limitations in ambulation tolerance, Quilting, Retired  Visits from [facility]: 8    Missed Visits: 0  Reporting Period : 2018 to 2018      Summary of Care:    Short Term Goals: To be accomplished in 3 weeks:  1. Patient will subjectively report full compliance with prescribed HEP. Eval: HEP provided  At DC: Progressing, HEP performance reported 1x/day  2. Patient will demonstrate a (-) R supine SLR in order to improve ease with sleep tolerance. Eval: (+) R Supine SLR  At DC: Remains (+) R  Supine SLR. 3. Patient will demonstrate a 25% improvement in lumbar extension AROM in order to improve ease with overhead functional lifting. Eval: Lumbar Extension = 90% limited, Reproduction of R lateral radicular pain  At DC: Inability to reassess due to high symptom irritability.      Long Term Goals: To be accomplished in 6 weeks:  1. Patient will demonstrate a significant functional improvement as demonstrated by a score of >/=73 on FOTO. Eval: FOTO = 69   At DC: Regressed: FOTO = 48, regressed by 21 since [facility]  2. Patient will demonstrate L/R SLS >/=15 seconds in order to improve ease with ambulation on uneven surfaces. Eval: L SLS <2 sec, R SLS <2 sec  At DC: progressing. SLS 10 sec, R 8 sec without LOB.    3. Patient will report >/=60% improvement in sleep quality in order to improve overall quality of life. Eval: 0%, ~4 sleep disturbances/night subjectively reported  At DC: Progressing. Pt reports waking up 2 times a night. G-Codes (GP)  Mobility   [id number] Goal  CJ= 20-39%  D/C  CK= 40-59%    The severity rating is based on clinical judgment and the FOTO Score score. ASSESSMENT/RECOMMENDATIONS:    At time of last follow follow up session 2/14/2018 patient placed on hold secondary to patient demonstrating a sudden change in status with onset of change reported 2/5/2018. Since this time patient had presented with a significant deterioration of gait mechanics, overall increase in symptom irritability and severity and reduction in activity tolerance. Reassessed patient at visit on 2/14/2018 due to change in status with patient noted with overall poor tolerance to attempted reassessment therefore minimal reliable information achieved. Patient did demonstrate a (+) supine SLR therefore trialed lumbar traction with requirement to cease prior to completion due to intolerance. Patient presentation has been inconsistent with patient today presenting at initiation of treatment with R antalgic gait pattern and decreased stance phase through the R LE due to pain with patient requiring assistance from quad cane due to instability. At cessation of treatment, with only supine traction x8 minutes and moist heat (L sidelying) delivered during session patient noted with significant improvement in gait symmetry with patient reporting pain reduced from 10/10 to 0/10 with patient subjectively reporting \"I feel like I could run out of here now\". Patient to be discharged at this time due to non-contact with clinic for >30 day period.     [x]Discontinue therapy: [x]Patient has reached or is progressing toward set goals      []Patient is non-compliant or has abdicated      []Due to lack of appreciable progress towards set 55 Alida Hernandez, PT 4/3/2018 10:19 AM

## 2018-07-31 ENCOUNTER — HOSPITAL ENCOUNTER (OUTPATIENT)
Age: 78
Discharge: HOME | End: 2018-07-31
Attending: ORTHOPAEDIC SURGERY

## 2018-07-31 DIAGNOSIS — M54.41 ACUTE BACK PAIN WITH SCIATICA, RIGHT: ICD-10-CM

## 2018-08-28 ENCOUNTER — OFFICE VISIT (OUTPATIENT)
Dept: UROLOGY | Age: 78
End: 2018-08-28

## 2018-08-28 ENCOUNTER — HOSPITAL ENCOUNTER (OUTPATIENT)
Dept: LAB | Age: 78
Discharge: HOME | End: 2018-08-28
Payer: MEDICARE

## 2018-08-28 VITALS
HEIGHT: 69 IN | SYSTOLIC BLOOD PRESSURE: 135 MMHG | DIASTOLIC BLOOD PRESSURE: 73 MMHG | BODY MASS INDEX: 29.92 KG/M2 | OXYGEN SATURATION: 98 % | HEART RATE: 71 BPM | WEIGHT: 202 LBS

## 2018-08-28 DIAGNOSIS — C61 PROSTATE CANCER (HCC): ICD-10-CM

## 2018-08-28 DIAGNOSIS — C61 PROSTATE CANCER (HCC): Primary | ICD-10-CM

## 2018-08-28 LAB
BILIRUB UR QL STRIP: NEGATIVE
GLUCOSE UR-MCNC: NEGATIVE MG/DL
KETONES P FAST UR STRIP-MCNC: NEGATIVE MG/DL
PH UR STRIP: 6.5 [PH] (ref 4.6–8)
PROT UR QL STRIP: NEGATIVE
PSA SERPL-MCNC: 2.6 NG/ML (ref 0–4)
SP GR UR STRIP: 1.01 (ref 1–1.03)
UA UROBILINOGEN AMB POC: NORMAL (ref 0.2–1)
URINALYSIS CLARITY POC: CLEAR
URINALYSIS COLOR POC: YELLOW
URINE BLOOD POC: NEGATIVE
URINE LEUKOCYTES POC: NEGATIVE
URINE NITRITES POC: NEGATIVE

## 2018-08-28 PROCEDURE — 84153 ASSAY OF PSA TOTAL: CPT | Performed by: UROLOGY

## 2018-08-28 RX ORDER — METHYLPREDNISOLONE 4 MG/1
TABLET ORAL
COMMUNITY
Start: 2018-08-27 | End: 2018-11-26 | Stop reason: ALTCHOICE

## 2018-08-28 NOTE — PATIENT INSTRUCTIONS
Prostate Cancer Screening: Care Instructions Your Care Instructions The prostate gland is an organ found just below a man's bladder. It is the size and shape of a walnut. It surrounds the tube that carries urine from the bladder out of the body through the penis. This tube is called the urethra. Prostate cancer is the abnormal growth of cells in the prostate. It is the second most common type of cancer in men. (Skin cancer is the most common.) Most cases of prostate cancer occur in men older than 72. The disease runs in families. And it's more common in -American men. When it's found and treated early, prostate cancer may be cured. But it is not always treated. This is because prostate cancer may not shorten your life, especially if you are older and the cancer is growing slowly. Follow-up care is a key part of your treatment and safety. Be sure to make and go to all appointments, and call your doctor if you are having problems. It's also a good idea to know your test results and keep a list of the medicines you take. What are the screening tests for prostate cancer? The main screening test for prostate cancer is the prostate-specific antigen (PSA) test. This is a blood test that measures how much PSA is in your blood. A high level may mean that you have an enlargement, an infection, or cancer. Along with the PSA test, you may have a digital rectal exam. The digital (finger) rectal exam checks for anything abnormal in your prostate. To do the exam, the doctor puts a lubricated, gloved finger into your rectum. If these tests suggest cancer, you may need a prostate biopsy. How is prostate cancer diagnosed? In a biopsy, the doctor takes small tissue samples from your prostate gland. Another doctor then looks at the tissue under a microscope to see if there are cancer cells, signs of infection, or other problems. The results help diagnose prostate cancer. What are the pros and cons of screening? Neither a PSA test nor a digital rectal exam can tell you for sure that you do or do not have cancer. But they can help you decide if you need more tests, such as a prostate biopsy. Screening tests may be useful because most men with prostate cancer don't have symptoms. It can be hard to know if you have cancer until it is more advanced. And then it's harder to treat. But having a PSA test can also cause harm. The test may show high levels of PSA that aren't caused by cancer. So you could have a prostate biopsy you didn't need. Or the PSA test might be normal when there is cancer, so a cancer might not be found early. The test can also find cancers that would never have caused a problem during your lifetime. So you might have treatment that was not needed. Prostate cancer usually develops late in life and grows slowly. For many men, it does not shorten their lives. Some experts advise screening only for men who are at high risk. Talk with your doctor to see if screening is right for you. Where can you learn more? Go to http://karlos-sammi.info/. Enter R550 in the search box to learn more about \"Prostate Cancer Screening: Care Instructions. \" Current as of: May 12, 2017 Content Version: 11.7 © 9715-3021 HomeLight. Care instructions adapted under license by [employer] (which disclaims liability or warranty for this information). If you have questions about a medical condition or this instruction, always ask your healthcare professional. [place] [address] any warranty or liability for your use of this information.

## 2018-08-28 NOTE — PROGRESS NOTES
Mr. Mackenzie Lagos has a reminder for a \"due or due soon\" health maintenance. I have asked that he contact his primary care provider for follow-up on this health maintenance. RBV Per Dr. Teddy Watson draw lab today for PSA for Prostate CA.

## 2018-08-28 NOTE — MR AVS SNAPSHOT
[facility] [address] [address] 
[phone] Patient: Sea Bustillo MRN: [id number] Visit Information Date & Time Provider Department Dept. Phone Encounter #  
 8/28/2018  9:30 AM Yohana Pérez [place] [facility] [id number] Your Appointments 11/15/2018  8:15 AM  
ESTABLISHED PATIENT with Niraj Lynch MD  
[facility] Appt Note: 1 year follow up with EKG/lipid/lft/cbc/bmp [address] [place] [address] [address]  
  
   
 [address] V[place]ha Luo [id number]  
  
    
 8/27/2019  9:15 AM  
Office Visit with David Todd MD  
[facility] Appt Note: checkup [address] [address] [address]  
[phone] [address] [facility] [address] Upcoming Health Maintenance Date Due DTaP/Tdap/Td series (1 - Tdap) 4/14/1960 ZOSTER VACCINE AGE 60> 2/14/1999 GLAUCOMA SCREENING Q2Y 4/14/2004 Pneumococcal 65+ High/Highest Risk (1 of 2 - PCV13) 4/14/2004 MEDICARE YEARLY EXAM 3/15/2018 Influenza Age 5 to Adult 8/1/2018 Allergies as of 8/28/2018  Review Complete On: 8/28/2018 By: Olive Abdul LPN Severity Noted Reaction Type Reactions Pcn [Penicillins]  06/02/2011   Side Effect Rash, Unknown (comments) Other reaction(s): unknown Current Immunizations  Never Reviewed No immunizations on file. Not reviewed this visit You Were Diagnosed With   
  
 Codes Comments Prostate cancer [facility])    -  Primary ICD-10-CM: L78 ICD-9-CM: 662 Vitals BP Pulse Height(growth percentile) Weight(growth percentile) SpO2 BMI  
 135/73 (BP 1 Location: Left arm, BP Patient Position: Sitting) 71 5' 9\" (1.753 m) 202 lb (91.6 kg) 98% 29.83 kg/m2 Smoking Status Never Smoker Vitals History BMI and BSA Data Body Mass Index Body Surface Area  
 29.83 kg/m 2 2.11 m 2 Preferred Pharmacy Pharmacy Name Phone [facility] [address] [phone] Your Updated Medication List  
  
   
This list is accurate as of 8/28/18 11:37 AM.  Always use your most recent med list.  
  
  
  
  
 acetaminophen 650 mg Tber Commonly known as:  TYLENOL Take 650 mg by mouth every eight (8) hours. amLODIPine 5 mg tablet Commonly known as:  NORVASC  
take 1 tablet by mouth once daily  
  
 aspirin delayed-release 81 mg tablet Take  by mouth daily. hydroCHLOROthiazide 12.5 mg tablet Commonly known as:  HYDRODIURIL  
TAKE 1 TABLET BY MOUTH EVERY OTHER DAY  
  
 lisinopril 5 mg tablet Commonly known as:  Myla Dontae Take 1 Tab by mouth daily. methylPREDNISolone 4 mg tablet Commonly known as:  MEDROL DOSEPACK  
  
 metoprolol tartrate 50 mg tablet Commonly known as:  LOPRESSOR Take  by mouth two (2) times a day. simvastatin 20 mg tablet Commonly known as:  ZOCOR Take  by mouth nightly. We Performed the Following AMB POC URINALYSIS DIP STICK AUTO W/O MICRO [97494 CPT(R)] COLLECTION VENOUS BLOOD,VENIPUNCTURE [id number] CPT(R)] Patient Instructions Prostate Cancer Screening: Care Instructions Your Care Instructions The prostate gland is an organ found just below a man's bladder. It is the size and shape of a walnut. It surrounds the tube that carries urine from the bladder out of the body through the penis. This tube is called the urethra. Prostate cancer is the abnormal growth of cells in the prostate. It is the second most common type of cancer in men. (Skin cancer is the most common.) Most cases of prostate cancer occur in men older than 72. The disease runs in families. And it's more common in -American men. When it's found and treated early, prostate cancer may be cured. But it is not always treated. This is because prostate cancer may not shorten your life, especially if you are older and the cancer is growing slowly. Follow-up care is a key part of your treatment and safety. Be sure to make and go to all appointments, and call your doctor if you are having problems. It's also a good idea to know your test results and keep a list of the medicines you take. What are the screening tests for prostate cancer? The main screening test for prostate cancer is the prostate-specific antigen (PSA) test. This is a blood test that measures how much PSA is in your blood. A high level may mean that you have an enlargement, an infection, or cancer. Along with the PSA test, you may have a digital rectal exam. The digital (finger) rectal exam checks for anything abnormal in your prostate. To do the exam, the doctor puts a lubricated, gloved finger into your rectum. If these tests suggest cancer, you may need a prostate biopsy. How is prostate cancer diagnosed? In a biopsy, the doctor takes small tissue samples from your prostate gland. Another doctor then looks at the tissue under a microscope to see if there are cancer cells, signs of infection, or other problems. The results help diagnose prostate cancer. What are the pros and cons of screening? Neither a PSA test nor a digital rectal exam can tell you for sure that you do or do not have cancer. But they can help you decide if you need more tests, such as a prostate biopsy. Screening tests may be useful because most men with prostate cancer don't have symptoms. It can be hard to know if you have cancer until it is more advanced. And then it's harder to treat. But having a PSA test can also cause harm. The test may show high levels of PSA that aren't caused by cancer.  So you could have a prostate biopsy you didn't need. Or the PSA test might be normal when there is cancer, so a cancer might not be found early. The test can also find cancers that would never have caused a problem during your lifetime. So you might have treatment that was not needed. Prostate cancer usually develops late in life and grows slowly. For many men, it does not shorten their lives. Some experts advise screening only for men who are at high risk. Talk with your doctor to see if screening is right for you. Where can you learn more? Go to http://karlos-sammi.info/. Enter R550 in the search box to learn more about \"Prostate Cancer Screening: Care Instructions. \" Current as of: May 12, 2017 Content Version: 11.7 © 5760-4836 Retrophin. Care instructions adapted under license by [employer] (which disclaims liability or warranty for this information). If you have questions about a medical condition or this instruction, always ask your healthcare professional. [address] any warranty or liability for your use of this information. Introducing hospitals & HEALTH SERVICES! Liyah Sykes introduces PIE Software patient portal. Now you can access parts of your medical record, email your doctor's office, and request medication refills online. 1. In your internet browser, go to [email or web address] Software 2. Click on the First Time User? Click Here link in the Sign In box. You will see the New Member Sign Up page. 3. Enter your PIE Software Access Code exactly as it appears below. You will not need to use this code after youve completed the sign-up process. If you do not sign up before the expiration date, you must request a new code. · PIE Software Access Code: [email or web address] Expires: 10/23/2018  1:32 PM 
 
4. Enter the last four digits of your Social Security Number (xxxx) and Date of Birth (mm/dd/yyyy) as indicated and click Submit.  You will be taken to the next sign-up page. 5. Create a Ksplice ID. This will be your Ksplice login ID and cannot be changed, so think of one that is secure and easy to remember. 6. Create a Ksplice password. You can change your password at any time. 7. Enter your Password Reset Question and Answer. This can be used at a later time if you forget your password. 8. Enter your e-mail address. You will receive e-mail notification when new information is available in [address] 9. Click Sign Up. You can now view and download portions of your medical record. 10. Click the Download Summary menu link to download a portable copy of your medical information. If you have questions, please visit the Frequently Asked Questions section of the Ksplice website. Remember, Ksplice is NOT to be used for urgent needs. For medical emergencies, dial 911. Now available from your iPhone and Android! Please provide this summary of care documentation to your next provider. Your primary care clinician is listed as Gerri Madsen. If you have any questions after today's visit, please call [phone].

## 2018-08-29 NOTE — PROGRESS NOTES
PSA 2.6 on 27 Aug 18 
 
 78 y.o. male                                    Prostate Carcinoma [place] [address] Histology/PSA 4.4/IM RT 2013 
  
 Janet Simon seen today for a carcinoma follow-up Patient is well with a solid stream good control nocturia twice per night-no complaints at this time 
  
  
PSA 2.6 in February 2014 PSA 1.2 in August 2014 
 PSA 0.7 in August 2015 PSA 1.0 in August 2016 PSA 1.2 in August 2017 Imp: rising PSA 5 yrs s/p XRT [place] [address] Ca P Plan; Bone scan- followed by Casodex/Lupron Rx Brian Martinez MD

## 2018-08-29 NOTE — PROGRESS NOTES
Kaylah Ar 77 y.o. male                                    Prostate Carcinoma [place] 6 Histology/PSA 4.4/IM RT 2013 Dorie Hager seen today for a carcinoma follow-up Patient is well with a solid stream good control nocturia twice per night-no complaints at this time PSA 2.6 in February 2014 PSA 1.2 in August 2014 
 PSA 0.7 in August 2015 PSA 1.0 in August 2016 PSA 1.2 in August 2017 
   
   
Review of Systems:  
CNS: No seizure syncope headaches dizziness or visual changes Respiratory-no wheezing no shortness of breath Cardiovascular:hypertension-no chest pains no palpitations Intestinal:negative no dyspepsia diarrhea or constipation Urinary: prostate carcinoma 2013 Skeletal: No large joint pain or muscle complaints Endocrine:negative diabetes Other: 
  
 
Allergies: Allergies Allergen Reactions  Pcn [Penicillins] Rash and Unknown (comments) Other reaction(s): unknown Medications:   
Current Outpatient Prescriptions Medication Sig Dispense Refill  methylPREDNISolone (MEDROL DOSEPACK) 4 mg tablet  lisinopril (PRINIVIL, ZESTRIL) 5 mg tablet Take 1 Tab by mouth daily. 30 Tab 6  
 amLODIPine (NORVASC) 5 mg tablet take 1 tablet by mouth once daily 90 Tab 3  
 hydroCHLOROthiazide (HYDRODIURIL) 12.5 mg tablet TAKE 1 TABLET BY MOUTH EVERY OTHER DAY 30 Tab 6  
 metoprolol (LOPRESSOR) 50 mg tablet Take  by mouth two (2) times a day.  simvastatin (ZOCOR) 20 mg tablet Take  by mouth nightly.  aspirin delayed-release 81 mg tablet Take  by mouth daily.  acetaminophen (TYLENOL) 650 mg TbER Take 650 mg by mouth every eight (8) hours. Past Medical History:  
Diagnosis Date  Cancer of prostate ([facility] [address] 4/2012  
 radiation- Dr Shi Champion  Essential hypertension  Gout  Obesity, unspecified Weight loss has been strongly encouraged by following dietary restrictions and an exercise routine.  Other and unspecified hyperlipidemia 1/12 Low density lipoproteins (LDLs) are at goal, triglycerides are at goal, high density lipoproteins (HDLs) are at goal.  
  
History reviewed. No pertinent surgical history. Social History Social History  Marital status:  Spouse name: N/A  
 Number of children: N/A  
 Years of education: N/A Occupational History  Not on file. Social History Main Topics  Smoking status: Never Smoker  Smokeless tobacco: Never Used  Alcohol use No  
 Drug use: No  
 Sexual activity: Not on file Other Topics Concern  Not on file Social History Narrative Family History Problem Relation Age of Onset  Heart Attack Father 54  
 Heart Disease Other Positive family history of heart disesae  Sudden Death Neg Hx Physical Examination: Well-nourished mature male in no apparent distress Resting by OSKAR is small smooth flat leathery firm and nontender -no nodularity 
 
urinalysis: Negative dipstick/nitrite negative/heme-negative Impression: Prostate carcinoma stable and OZZY 5 years status post XRT Plan: PSA today RTC 1 year More than 1/2 of this 15 minute visit was spent in counselling and coordination of care, as described above. Timothy Velarde MD 
-electronically signed- 
 
PLEASE NOTE: 
This document has been produced using voice recognition software. Unrecognized errors in transcription may be present.

## 2018-09-12 ENCOUNTER — DOCUMENTATION ONLY (OUTPATIENT)
Dept: UROLOGY | Age: 78
End: 2018-09-12

## 2018-09-12 DIAGNOSIS — C61 PROSTATE CANCER (HCC): Primary | ICD-10-CM

## 2018-09-19 ENCOUNTER — HOSPITAL ENCOUNTER (OUTPATIENT)
Dept: NUCLEAR MEDICINE | Age: 78
Discharge: HOME | End: 2018-09-19
Attending: UROLOGY
Payer: MEDICARE

## 2018-09-19 DIAGNOSIS — C61 PROSTATE CANCER (HCC): ICD-10-CM

## 2018-09-19 PROCEDURE — 78306 BONE IMAGING WHOLE BODY: CPT

## 2018-09-25 RX ORDER — HYDROCHLOROTHIAZIDE 25 MG/1
12.5 TABLET ORAL DAILY
Qty: 30 TAB | Refills: 3 | Status: SHIPPED | OUTPATIENT
Start: 2018-09-25 | End: 2019-05-18 | Stop reason: SDUPTHER

## 2018-09-25 NOTE — TELEPHONE ENCOUNTER
Iza Edward received in concern to hydrochlorothiazide 12.5 mg recall. Patient currently on Hydrodiuril 12.5 mg tab; currently prescribed to  take one by mouth every other day.  Please advise

## 2018-10-01 ENCOUNTER — OFFICE VISIT (OUTPATIENT)
Dept: UROLOGY | Age: 78
End: 2018-10-01

## 2018-10-01 VITALS
SYSTOLIC BLOOD PRESSURE: 126 MMHG | DIASTOLIC BLOOD PRESSURE: 72 MMHG | OXYGEN SATURATION: 99 % | HEIGHT: 69 IN | HEART RATE: 90 BPM

## 2018-10-01 DIAGNOSIS — C61 PROSTATE CANCER (HCC): Primary | ICD-10-CM

## 2018-10-01 LAB
BILIRUB UR QL STRIP: NEGATIVE
GLUCOSE UR-MCNC: NEGATIVE MG/DL
KETONES P FAST UR STRIP-MCNC: NEGATIVE MG/DL
PH UR STRIP: 6.5 [PH] (ref 4.6–8)
PROT UR QL STRIP: NEGATIVE
SP GR UR STRIP: 1.01 (ref 1–1.03)
UA UROBILINOGEN AMB POC: NORMAL (ref 0.2–1)
URINALYSIS CLARITY POC: CLEAR
URINALYSIS COLOR POC: YELLOW
URINE BLOOD POC: NEGATIVE
URINE LEUKOCYTES POC: NEGATIVE
URINE NITRITES POC: NEGATIVE

## 2018-10-01 RX ORDER — CELECOXIB 200 MG/1
CAPSULE ORAL
Refills: 0 | COMMUNITY
Start: 2018-09-28 | End: 2018-11-26

## 2018-10-01 RX ORDER — BICALUTAMIDE 50 MG/1
50 TABLET, FILM COATED ORAL DAILY
Qty: 90 TAB | Refills: 3 | Status: SHIPPED | OUTPATIENT
Start: 2018-10-01 | End: 2019-09-15 | Stop reason: SDUPTHER

## 2018-10-01 NOTE — PROGRESS NOTES
Berry Arroyo 77 y.o. male Prostate Carcinoma [place] 6 Histology PSA 4.4/IM RT 2013     Essie Gama seen today for prostate carcinoma follow-up  Patient has been doing well with stable low-level PSA elevation during the past 2 years with most recent PSA in  August 2018 showing rise to 2.6-here today for results of bone scan metastatic survey  Patient has no complaints of bone pain no voiding symptoms at this time    Bone scan on 19 September 2020 18- for signs of bony metastases-images have been reviewed with the patient today on PACS laser printed for the patient       PSA 1.2 in August 2014   PSA 0.7 in August 2015  PSA 1.0 in August 2016  PSA 1.2 in August 2017   PSA 2.6 in August 2018            Review of Systems:   CNS: No seizure syncope headaches dizziness or visual changes  Respiratory-no wheezing no shortness of breath  Cardiovascular:hypertension-no chest pains no palpitations  Intestinal:negative no dyspepsia diarrhea or constipation  Urinary: prostate carcinoma 2013  Skeletal: No large joint pain or muscle complaints  Endocrine:negative diabetes  Other:    Allergies: Allergies   Allergen Reactions    Pcn [Penicillins] Rash and Unknown (comments)     Other reaction(s): unknown      Medications:    Current Outpatient Prescriptions   Medication Sig Dispense Refill    bicalutamide (CASODEX) 50 mg tablet Take 1 Tab by mouth daily. Indications: metastatic prostate carcinoma 90 Tab 3    hydroCHLOROthiazide (HYDRODIURIL) 25 mg tablet Take 0.5 Tabs by mouth daily. 30 Tab 3    lisinopril (PRINIVIL, ZESTRIL) 5 mg tablet Take 1 Tab by mouth daily. 30 Tab 6    amLODIPine (NORVASC) 5 mg tablet take 1 tablet by mouth once daily 90 Tab 3    metoprolol (LOPRESSOR) 50 mg tablet Take  by mouth two (2) times a day.  simvastatin (ZOCOR) 20 mg tablet Take  by mouth nightly.  aspirin delayed-release 81 mg tablet Take  by mouth daily.         celecoxib (CELEBREX) 200 mg capsule take 1 capsule by mouth once daily if needed for pain and SWELLING. TAKE WITH FOOD  0    methylPREDNISolone (MEDROL DOSEPACK) 4 mg tablet       acetaminophen (TYLENOL) 650 mg TbER Take 650 mg by mouth every eight (8) hours. Past Medical History:   Diagnosis Date    Cancer of prostate ([address] 4/2012    radiation- Dr Jeramy Ross Essential hypertension     Gout     Obesity, unspecified     Weight loss has been strongly encouraged by following dietary restrictions and an exercise routine.  Other and unspecified hyperlipidemia     1/12 Low density lipoproteins (LDLs) are at goal, triglycerides are at goal, high density lipoproteins (HDLs) are at goal.      History reviewed. No pertinent surgical history. Social History     Social History    Marital status:      Spouse name: N/A    Number of children: N/A    Years of education: N/A     Occupational History    Not on file. Social History Main Topics    Smoking status: Never Smoker    Smokeless tobacco: Never Used    Alcohol use No    Drug use: No    Sexual activity: Not on file     Other Topics Concern    Not on file     Social History Narrative      Family History   Problem Relation Age of Onset    Heart Attack Father 54    Heart Disease Other      Positive family history of heart disesae    Sudden Death Neg Hx         Physical Examination: Well-nourished mature male in no apparent distress      Urinalysis: Negative heme/negative nitrite        Impression: Prostate carcinoma [place] 6 histology 5 years status post IM RT with elevated                           PSA/negative bone scan        Plan: Casodex 50 mg daily    RTC 2 weeks for initiation of androgen ablation therapy with Lupron      More than 1/2 of this 15minute visit was spent in counselling and coordination of care, as described above. Tila Fabian MD  -electronically signed-      PLEASE NOTE:  This document has been produced using voice recognition software.  Unrecognized errors in transcription may be present.

## 2018-10-01 NOTE — PATIENT INSTRUCTIONS
Prostate Cancer: Care Instructions  Your Care Instructions    The prostate gland is a small, walnut-shaped organ that lies just below a man's bladder. It surrounds the urethra, the tube that carries urine from the bladder out of the body through the penis. Prostate cancer is the abnormal growth of cells in the prostate gland. Prostate cancer cells can spread within the prostate, to nearby lymph nodes and other tissues, and to other parts of the body. When the cancer hasn't spread outside the prostate, it is called localized prostate cancer. With localized prostate cancer, your options depend on how likely it is that your cancer will grow. Tests will show if your cancer is likely to grow. · Low-risk cancer isn't likely to grow right away. If your cancer is low-risk, you can choose active surveillance. This means your cancer will be watched closely by your doctor with regular checkups and tests to see if the cancer grows. This choice allows you to delay having surgery or radiation, often for many years. If the cancer grows very slowly, you may never need treatment. · Medium-risk cancer is more likely to grow. Some men with this type of cancer may be able to choose active surveillance. Others may need to choose surgery or radiation. · High-risk cancer is most likely to grow. If you have high-risk cancer, you will likely need to choose surgery or radiation. If your cancer has already spread outside the prostate or to other parts of the body, then you may have other treatments, like chemotherapy or hormone therapy. If you are over age [de-identified] or have other serious health problems, like heart disease, you may choose not to have treatments to cure your cancer. Instead, you can just have treatments to manage your symptoms. This is called watchful waiting. Finding out that you have cancer is scary. You may feel many emotions and may need some help coping. Seek out family, friends, and counselors for support.  You also can do things at home to make yourself feel better while you go through treatment. Call the [facility] (3-800.934.5438) or visit its website at [address] [employer] for more information. Follow-up care is a key part of your treatment and safety. Be sure to make and go to all appointments, and call your doctor if you are having problems. It's also a good idea to know your test results and keep a list of the medicines you take. How can you care for yourself at home? · Your doctor will talk to you about your treatment options. You may need to learn more about each of them before you can decide which treatment is best for you. · Take your medicines exactly as prescribed. Call your doctor if you think you are having a problem with your medicine. You will get more details on the specific medicines your doctor prescribes. · Eat healthy food. If you do not feel like eating, try to eat food that has protein and extra calories to keep up your strength and prevent weight loss. Drink liquid meal replacements for extra calories and protein. Try to eat your main meal early. · Take steps to control your stress and workload. Learn relaxation techniques. ¨ Share your feelings. Stress and tension affect our emotions. By expressing your feelings to others, you may be able to understand and cope with them. ¨ Consider joining a support group. Talking about a problem with your spouse, a good friend, or other people with similar problems is a good way to reduce tension and stress. ¨ Express yourself through art. Try writing, crafts, dance, or art to relieve stress. Some dance, writing, or art groups may be available just for people who have cancer. ¨ Be kind to your body and mind. Getting enough sleep, eating a healthy diet, and taking time to do things you enjoy can contribute to an overall feeling of balance in your life and can help reduce stress. ¨ Get help if you need it.  Discuss your concerns with your doctor or counselor. · Get some physical activity every day, but do not get too tired. Keep doing the hobbies you enjoy as your energy allows. · If you are vomiting or have diarrhea:  ¨ Drink plenty of fluids (enough so that your urine is light yellow or clear like water) to prevent dehydration. Choose water and other caffeine-free clear liquids. If you have kidney, heart, or liver disease and have to limit fluids, talk with your doctor before you increase the amount of fluids you drink. ¨ When you are able to eat, try clear soups, mild foods, and liquids until all symptoms are gone for 12 to 48 hours. Jell-O, dry toast, crackers, and cooked cereal are also good choices. · If you have not already done so, prepare a list of advance directives. Advance directives are instructions to your doctor and family members about what kind of care you want if you become unable to speak or express yourself. When should you call for help? Call 911 anytime you think you may need emergency care. For example, call if:    · You passed out (lost consciousness).    Call your doctor now or seek immediate medical care if:    · You have new or worse pain.     · You have new symptoms, such as a cough, belly pain, vomiting, diarrhea, or a rash.     · You have symptoms of a urinary tract infection. For example:  ¨ You have blood or pus in your urine. ¨ You have pain in your back just below your rib cage. This is called flank pain. ¨ You have a fever, chills, or body aches. ¨ It hurts to urinate. ¨ You have groin or belly pain.    Watch closely for changes in your health, and be sure to contact your doctor if:    · You have swollen glands in your armpits, groin, or neck.     · You have trouble controlling your urine.     · You do not get better as expected. Where can you learn more? Go to http://karols-sammi.info/. Enter V141 in the search box to learn more about \"Prostate Cancer: Care Instructions. \"  Current as of:  May 12, 2017  Content Version: 11.7  © 3253-3535 [employer]. Care instructions adapted under license by [employer] (which disclaims liability or warranty for this information). If you have questions about a medical condition or this instruction, always ask your healthcare professional. [address] any warranty or liability for your use of this information.

## 2018-10-01 NOTE — MR AVS SNAPSHOT
[facility] [address] [address] 
[phone] Patient: Britney Gaffney MRN: [id number] GZC:[id number] Visit Information Date & Time Provider Department Dept. Phone Encounter #  
 10/1/2018  1:45 PM Yohana Camara [facility] [phone] [id number] Follow-up Instructions Return in about 2 weeks (around 10/15/2018) for Lupron injection. Follow-up and Disposition History Your Appointments 10/22/2018 10:45 AM  
IMMUNIZATIONS/INJECTIONS with Aniyah Ge MD  
[facility] [address] [place] [address]) Appt Note: 1st lupron [address] [place] [address] [address]  
[phone] [address] [id number]  
  
    
 11/15/2018  8:15 AM  
ESTABLISHED PATIENT with Cecily Holguin MD  
[facility] ([address] [place] [address]) Appt Note: 1 year follow up with EKG/lipid/lft/cbc/bmp [address] [place] [address] [address]  
  
   
 [address] [address] [address] [address]  
  
    
 8/27/2019  9:15 AM  
Office Visit with Aniyah Ge MD  
[facility] [address] [place] [address]) Appt Note: checkup [address] [address] [address]  
[phone] [address] [place] [address] Upcoming Health Maintenance Date Due DTaP/Tdap/Td series (1 - Tdap) 4/14/1960 Shingrix Vaccine Age 50> (1 of 2) 4/14/1989 GLAUCOMA SCREENING Q2Y 4/14/2004 Pneumococcal 65+ High/Highest Risk (1 of 2 - PCV13) 4/14/2004 MEDICARE YEARLY EXAM 3/15/2018 Influenza Age 5 to Adult 8/1/2018 Allergies as of 10/1/2018  Review Complete On: 10/1/2018 By: Aniyah Ge MD  
  
 Severity Noted Reaction Type Reactions Pcn [Penicillins]  06/02/2011   Side Effect Rash, Unknown (comments) Other reaction(s): unknown Current Immunizations  Never Reviewed No immunizations on file. Not reviewed this visit You Were Diagnosed With   
  
 Codes Comments Prostate cancer [facility])    -  Primary ICD-10-CM: H74 ICD-9-CM: 786 Vitals BP Pulse Height(growth percentile) SpO2 Smoking Status 126/72 (BP 1 Location: Left arm, BP Patient Position: Sitting) 90 5' 9\" (1.753 m) 99% Never Smoker Vitals History Preferred Pharmacy Pharmacy Name Phone [facility], [address] [phone] Your Updated Medication List  
  
   
This list is accurate as of 10/1/18  1:54 PM.  Always use your most recent med list.  
  
  
  
  
 acetaminophen 650 mg Tber Commonly known as:  TYLENOL Take 650 mg by mouth every eight (8) hours. amLODIPine 5 mg tablet Commonly known as:  NORVASC  
take 1 tablet by mouth once daily  
  
 aspirin delayed-release 81 mg tablet Take  by mouth daily. bicalutamide 50 mg tablet Commonly known as:  CASODEX Take 1 Tab by mouth daily. Indications: metastatic prostate carcinoma  
  
 celecoxib 200 mg capsule Commonly known as:  CELEBREX  
take 1 capsule by mouth once daily if needed for pain and SWELLING. TAKE WITH FOOD  
  
 hydroCHLOROthiazide 25 mg tablet Commonly known as:  HYDRODIURIL Take 0.5 Tabs by mouth daily. lisinopril 5 mg tablet Commonly known as:  Soraya Fossa Take 1 Tab by mouth daily. methylPREDNISolone 4 mg tablet Commonly known as:  MEDROL DOSEPACK  
  
 metoprolol tartrate 50 mg tablet Commonly known as:  LOPRESSOR Take  by mouth two (2) times a day. simvastatin 20 mg tablet Commonly known as:  ZOCOR Take  by mouth nightly. Prescriptions Sent to Pharmacy Refills  
 bicalutamide (CASODEX) 50 mg tablet 3 Sig: Take 1 Tab by mouth daily. Indications: metastatic prostate carcinoma  Class: Normal  
 Pharmacy: [address] [facility], [address]  #: [phone] Route: Oral  
  
We Performed the Following AMB POC URINALYSIS DIP STICK AUTO W/O MICRO [15219 CPT(R)] Follow-up Instructions Return in about 2 weeks (around 10/15/2018) for Lupron injection. Patient Instructions Prostate Cancer: Care Instructions Your Care Instructions The prostate gland is a small, walnut-shaped organ that lies just below a man's bladder. It surrounds the urethra, the tube that carries urine from the bladder out of the body through the penis. Prostate cancer is the abnormal growth of cells in the prostate gland. Prostate cancer cells can spread within the prostate, to nearby lymph nodes and other tissues, and to other parts of the body. When the cancer hasn't spread outside the prostate, it is called localized prostate cancer. With localized prostate cancer, your options depend on how likely it is that your cancer will grow. Tests will show if your cancer is likely to grow. · Low-risk cancer isn't likely to grow right away. If your cancer is low-risk, you can choose active surveillance. This means your cancer will be watched closely by your doctor with regular checkups and tests to see if the cancer grows. This choice allows you to delay having surgery or radiation, often for many years. If the cancer grows very slowly, you may never need treatment. · Medium-risk cancer is more likely to grow. Some men with this type of cancer may be able to choose active surveillance. Others may need to choose surgery or radiation. · High-risk cancer is most likely to grow. If you have high-risk cancer, you will likely need to choose surgery or radiation. If your cancer has already spread outside the prostate or to other parts of the body, then you may have other treatments, like chemotherapy or hormone therapy. If you are over age [de-identified] or have other serious health problems, like heart disease, you may choose not to have treatments to cure your cancer. Instead, you can just have treatments to manage your symptoms. This is called watchful waiting. Finding out that you have cancer is scary. You may feel many emotions and may need some help coping. Seek out family, friends, and counselors for support. You also can do things at home to make yourself feel better while you go through treatment. Call the [facility] (4-557.145.7933) or visit its website at [address] [employer] for more information. Follow-up care is a key part of your treatment and safety. Be sure to make and go to all appointments, and call your doctor if you are having problems. It's also a good idea to know your test results and keep a list of the medicines you take. How can you care for yourself at home? · Your doctor will talk to you about your treatment options. You may need to learn more about each of them before you can decide which treatment is best for you. · Take your medicines exactly as prescribed. Call your doctor if you think you are having a problem with your medicine. You will get more details on the specific medicines your doctor prescribes. · Eat healthy food. If you do not feel like eating, try to eat food that has protein and extra calories to keep up your strength and prevent weight loss. Drink liquid meal replacements for extra calories and protein. Try to eat your main meal early. · Take steps to control your stress and workload. Learn relaxation techniques. ¨ Share your feelings. Stress and tension affect our emotions. By expressing your feelings to others, you may be able to understand and cope with them. ¨ Consider joining a support group. Talking about a problem with your spouse, a good friend, or other people with similar problems is a good way to reduce tension and stress. ¨ Express yourself through art. Try writing, crafts, dance, or art to relieve stress. Some dance, writing, or art groups may be available just for people who have cancer. ¨ Be kind to your body and mind. Getting enough sleep, eating a healthy diet, and taking time to do things you enjoy can contribute to an overall feeling of balance in your life and can help reduce stress. ¨ Get help if you need it. Discuss your concerns with your doctor or counselor. · Get some physical activity every day, but do not get too tired. Keep doing the hobbies you enjoy as your energy allows. · If you are vomiting or have diarrhea: ¨ Drink plenty of fluids (enough so that your urine is light yellow or clear like water) to prevent dehydration. Choose water and other caffeine-free clear liquids. If you have kidney, heart, or liver disease and have to limit fluids, talk with your doctor before you increase the amount of fluids you drink. ¨ When you are able to eat, try clear soups, mild foods, and liquids until all symptoms are gone for 12 to 48 hours. Jell-O, dry toast, crackers, and cooked cereal are also good choices. · If you have not already done so, prepare a list of advance directives. Advance directives are instructions to your doctor and family members about what kind of care you want if you become unable to speak or express yourself. When should you call for help? Call 911 anytime you think you may need emergency care. For example, call if: 
  · You passed out (lost consciousness).  
 Call your doctor now or seek immediate medical care if: 
  · You have new or worse pain.  
  · You have new symptoms, such as a cough, belly pain, vomiting, diarrhea, or a rash.  
  · You have symptoms of a urinary tract infection. For example: ¨ You have blood or pus in your urine. ¨ You have pain in your back just below your rib cage. This is called flank pain. ¨ You have a fever, chills, or body aches. ¨ It hurts to urinate. ¨ You have groin or belly pain.  
 Watch closely for changes in your health, and be sure to contact your doctor if: 
  · You have swollen glands in your armpits, groin, or neck.   · You have trouble controlling your urine.  
  · You do not get better as expected. Where can you learn more? Go to http://karlos-sammi.info/. Enter V141 in the search box to learn more about \"Prostate Cancer: Care Instructions. \" Current as of: May 12, 2017 Content Version: 11.7 © 9449-2600 [employer]. Care instructions adapted under license by [employer] (which disclaims liability or warranty for this information). If you have questions about a medical condition or this instruction, always ask your healthcare professional. [address] any warranty or liability for your use of this information. Patient Instructions History Introducing [facility]! Truman Boogie introduces [employer] patient portal. Now you can access parts of your medical record, email your doctor's office, and request medication refills online. 1. In your internet browser, go to [email or web address] 2. Click on the First Time User? Click Here link in the Sign In box. You will see the New Member Sign Up page. 3. Enter your [employer] Access Code exactly as it appears below. You will not need to use this code after youve completed the sign-up process. If you do not sign up before the expiration date, you must request a new code. · [employer] Access Code: [email or web address] Expires: 10/23/2018  1:32 PM 
 
4. Enter the last four digits of your Social Security Number (xxxx) and Date of Birth (mm/dd/yyyy) as indicated and click Submit. You will be taken to the next sign-up page. 5. Create a [employer] ID. This will be your [employer] login ID and cannot be changed, so think of one that is secure and easy to remember. 6. Create a [employer] password. You can change your password at any time. 7. Enter your Password Reset Question and Answer. This can be used at a later time if you forget your password. 8. Enter your e-mail address. You will receive e-mail notification when new information is available in [address] 9. Click Sign Up. You can now view and download portions of your medical record. 10. Click the Download Summary menu link to download a portable copy of your medical information. If you have questions, please visit the Frequently Asked Questions section of the [employer] website. Remember, [employer] is NOT to be used for urgent needs. For medical emergencies, dial 911. Now available from your iPhone and Android! Please provide this summary of care documentation to your next provider. Your primary care clinician is listed as Dale Rivera. If you have any questions after today's visit, please call [phone].

## 2018-10-01 NOTE — PROGRESS NOTES
Mr. Abilio Sy has a reminder for a \"due or due soon\" health maintenance. I have asked that he contact his primary care provider for follow-up on this health maintenance.

## 2018-10-04 ENCOUNTER — TELEPHONE (OUTPATIENT)
Dept: CARDIOLOGY CLINIC | Age: 78
End: 2018-10-04

## 2018-10-04 DIAGNOSIS — I10 ESSENTIAL HYPERTENSION, BENIGN: Primary | ICD-10-CM

## 2018-10-04 NOTE — TELEPHONE ENCOUNTER
Patient called in want to know if he should take HCTZ 25 mg a day or take 1/2 of 25 mg tablet since 12.5 mg tablets was recall.  Please advise

## 2018-10-04 NOTE — TELEPHONE ENCOUNTER
Per our office list, patient is supposed to take half of 25 mg tablet which is what he should continue.   Get a BMP in 2 weeks

## 2018-10-04 NOTE — TELEPHONE ENCOUNTER
Spoke with patient and he understands to take 1/2 tab of HCTZ a day and get BMP in 2 weeks. He voices understanding and acceptance of this advice and will call back if any further questions or concerns.

## 2018-10-19 LAB
ANION GAP SERPL CALC-SCNC: 9.4 MMOL/L
BUN SERPL-MCNC: 9 MG/DL (ref 6–22)
CALCIUM SERPL-MCNC: 9.4 MG/DL (ref 8.4–10.4)
CHLORIDE SERPL-SCNC: 102 MMOL/L (ref 98–110)
CO2 SERPL-SCNC: 27 MMOL/L (ref 20–32)
CREAT SERPL-MCNC: 0.8 MG/DL (ref 0.8–1.6)
GFRAA, 66117: >60
GFRNA, 66118: >60
GLUCOSE SERPL-MCNC: 92 MG/DL (ref 70–99)
POTASSIUM SERPL-SCNC: 4.3 MMOL/L (ref 3.5–5.5)
SODIUM SERPL-SCNC: 138 MMOL/L (ref 133–145)

## 2018-10-22 ENCOUNTER — CLINICAL SUPPORT (OUTPATIENT)
Dept: UROLOGY | Age: 78
End: 2018-10-22

## 2018-10-22 VITALS
HEART RATE: 75 BPM | WEIGHT: 202 LBS | DIASTOLIC BLOOD PRESSURE: 69 MMHG | BODY MASS INDEX: 29.92 KG/M2 | OXYGEN SATURATION: 100 % | SYSTOLIC BLOOD PRESSURE: 131 MMHG | HEIGHT: 69 IN

## 2018-10-22 DIAGNOSIS — C61 PROSTATE CARCINOMA (HCC): Primary | ICD-10-CM

## 2018-10-22 LAB
BILIRUB UR QL STRIP: NEGATIVE
GLUCOSE UR-MCNC: NEGATIVE MG/DL
KETONES P FAST UR STRIP-MCNC: NEGATIVE MG/DL
PH UR STRIP: 7 [PH] (ref 4.6–8)
PROT UR QL STRIP: NEGATIVE
SP GR UR STRIP: 1.01 (ref 1–1.03)
UA UROBILINOGEN AMB POC: NORMAL (ref 0.2–1)
URINALYSIS CLARITY POC: CLEAR
URINALYSIS COLOR POC: YELLOW
URINE BLOOD POC: NEGATIVE
URINE LEUKOCYTES POC: NEGATIVE
URINE NITRITES POC: NEGATIVE

## 2018-10-23 NOTE — PROGRESS NOTES
Lala Polo 77 y.o. male                                       Prostate Carcinoma [address] Histology/PSA 4.4/IM RT 2013       Mr. Jaz Scott seen today for prostate carcinoma follow-up-here today for initiation of androgen ablation therapy with Lupron injection having started Casodex treatment 2 weeks ago  stable low-level PSA elevation during the past 2 years with most recent PSA in  August 2018 showing rise to 2.6-here today for results of bone scan metastatic survey  Patient has no complaints of bone pain no voiding symptoms at this time     Bone scan on 19 September 2018- for signs of bony metastases-images have been reviewed with the patient today on PACS laser printed for the patient     PSA 1.2 in August 2014   PSA 0.7 in August 2015  PSA 1.0 in August 2016  PSA 1.2 in August 2017   PSA 2.6 in August 2018     Review of Systems:   CNS: No seizure syncope headaches dizziness or visual changes  Respiratory-no wheezing no shortness of breath  Cardiovascular:hypertension-no chest pains no palpitations  Intestinal:negative no dyspepsia diarrhea or constipation  Urinary: prostate carcinoma 2013  Skeletal: No large joint pain or muscle complaints  Endocrine:negative diabetes  Other:    Allergies: Allergies   Allergen Reactions    Pcn [Penicillins] Rash and Unknown (comments)     Other reaction(s): unknown      Medications:    Current Outpatient Medications   Medication Sig Dispense Refill    bicalutamide (CASODEX) 50 mg tablet Take 1 Tab by mouth daily. Indications: metastatic prostate carcinoma 90 Tab 3    hydroCHLOROthiazide (HYDRODIURIL) 25 mg tablet Take 0.5 Tabs by mouth daily. 30 Tab 3    lisinopril (PRINIVIL, ZESTRIL) 5 mg tablet Take 1 Tab by mouth daily. 30 Tab 6    amLODIPine (NORVASC) 5 mg tablet take 1 tablet by mouth once daily 90 Tab 3    metoprolol (LOPRESSOR) 50 mg tablet Take  by mouth two (2) times a day.  simvastatin (ZOCOR) 20 mg tablet Take  by mouth nightly.         aspirin delayed-release 81 mg tablet Take  by mouth daily.  celecoxib (CELEBREX) 200 mg capsule take 1 capsule by mouth once daily if needed for pain and SWELLING. TAKE WITH FOOD  0    methylPREDNISolone (MEDROL DOSEPACK) 4 mg tablet       acetaminophen (TYLENOL) 650 mg TbER Take 650 mg by mouth every eight (8) hours. Past Medical History:   Diagnosis Date    Cancer of prostate ([facility] [address] 4/2012    radiation- Dr Nam Cates Essential hypertension     Gout     Obesity, unspecified     Weight loss has been strongly encouraged by following dietary restrictions and an exercise routine.  Other and unspecified hyperlipidemia     1/12 Low density lipoproteins (LDLs) are at goal, triglycerides are at goal, high density lipoproteins (HDLs) are at goal.      History reviewed. No pertinent surgical history.   Social History     Socioeconomic History    Marital status:      Spouse name: Not on file    Number of children: Not on file    Years of education: Not on file    Highest education level: Not on file   Social Needs    Financial resource strain: Not on file    Food insecurity - worry: Not on file    Food insecurity - inability: Not on file    Transportation needs - medical: Not on file   [employer] needs - non-medical: Not on file   Occupational History    Not on file   Tobacco Use    Smoking status: Never Smoker    Smokeless tobacco: Never Used   Substance and Sexual Activity    Alcohol use: No    Drug use: No    Sexual activity: Not on file   Other Topics Concern    Not on file   Social History Narrative    Not on file      Family History   Problem Relation Age of Onset    Heart Attack Father 54    Heart Disease Other         Positive family history of heart disesae    Sudden Death Neg Hx         Physical Examination: Well-nourished mature male in no apparent distress      Urinalysis: Negative dipstick/nitrite negative/heme-negative     Impression: Prostate carcinoma years status post IM RT with rising PSA        Plan: Lupron 45 mg IM right buttock today    Continue Casodex 50 mg daily    Described discussed prospect of intermittent Lupron therapy/prognosis of castration responsive prostate carcinoma    RTC 6 months-Lupron injection      More than 1/2 of this 15 minute visit was spent in counselling and coordination of care, as described above. Tyshawn Vogel MD  -electronically signed-    PLEASE NOTE:  This document has been produced using voice recognition software. Unrecognized errors in transcription may be present.

## 2018-10-26 DIAGNOSIS — I10 ESSENTIAL HYPERTENSION, BENIGN: ICD-10-CM

## 2018-10-26 RX ORDER — LISINOPRIL 5 MG/1
TABLET ORAL
Qty: 30 TAB | Refills: 6 | Status: SHIPPED | OUTPATIENT
Start: 2018-10-26 | End: 2019-05-17 | Stop reason: SDUPTHER

## 2018-11-26 ENCOUNTER — OFFICE VISIT (OUTPATIENT)
Dept: CARDIOLOGY CLINIC | Age: 78
End: 2018-11-26

## 2018-11-26 VITALS
WEIGHT: 205 LBS | HEIGHT: 69 IN | DIASTOLIC BLOOD PRESSURE: 66 MMHG | SYSTOLIC BLOOD PRESSURE: 133 MMHG | BODY MASS INDEX: 30.36 KG/M2 | HEART RATE: 75 BPM

## 2018-11-26 DIAGNOSIS — I25.10 ATHEROSCLEROSIS OF NATIVE CORONARY ARTERY OF NATIVE HEART WITHOUT ANGINA PECTORIS: Primary | ICD-10-CM

## 2018-11-26 DIAGNOSIS — E78.00 PURE HYPERCHOLESTEROLEMIA: ICD-10-CM

## 2018-11-26 DIAGNOSIS — R60.9 EDEMA, UNSPECIFIED TYPE: ICD-10-CM

## 2018-11-26 DIAGNOSIS — I10 ESSENTIAL HYPERTENSION, BENIGN: ICD-10-CM

## 2018-11-26 DIAGNOSIS — E66.9 OBESITY (BMI 30.0-34.9): ICD-10-CM

## 2018-11-26 NOTE — LETTER
Ronak Rizzo 1940 11/26/2018 Dear MD Dot Whaley MD 
 
I had the pleasure of evaluating  Mr. Vale Rodrigez in office today. Below are the relevant portions of my assessment and plan of care. ICD-10-CM ICD-9-CM 1. Atherosclerosis of native coronary artery of native heart without angina pectoris I25.10 414.01 AMB POC EKG ROUTINE W/ 12 LEADS, INTER & REP  
   ECHO ADULT COMPLETE  
   NUCLEAR CARDIAC STRESS TEST 2. Essential hypertension, benign I10 401.1 3. Pure hypercholesterolemia E78.00 272.0   
4. Obesity (BMI 30.0-34. 9) E66.9 278.00   
5. Edema, unspecified type R60.9 782.3 ECHO ADULT COMPLETE Current Outpatient Medications Medication Sig Dispense Refill  lisinopril (PRINIVIL, ZESTRIL) 5 mg tablet take 1 tablet by mouth once daily 30 Tab 6  
 bicalutamide (CASODEX) 50 mg tablet Take 1 Tab by mouth daily. Indications: metastatic prostate carcinoma 90 Tab 3  
 hydroCHLOROthiazide (HYDRODIURIL) 25 mg tablet Take 0.5 Tabs by mouth daily. 30 Tab 3  
 amLODIPine (NORVASC) 5 mg tablet take 1 tablet by mouth once daily 90 Tab 3  
 metoprolol (LOPRESSOR) 50 mg tablet Take  by mouth two (2) times a day.  simvastatin (ZOCOR) 20 mg tablet Take  by mouth nightly.  aspirin delayed-release 81 mg tablet Take  by mouth daily. Orders Placed This Encounter  AMB POC EKG ROUTINE W/ 12 LEADS, INTER & REP Order Specific Question:   Reason for Exam: Answer:   cad If you have questions, please do not hesitate to call me. I look forward to following Mr. Vale Rodrigez along with you. Sincerely, Kathy Johnson MD

## 2018-11-26 NOTE — PATIENT INSTRUCTIONS
Medications Discontinued During This Encounter Medication Reason  methylPREDNISolone (MEDROL DOSEPACK) 4 mg tablet Therapy Completed  acetaminophen (TYLENOL) 650 mg TbER  celecoxib (CELEBREX) 200 mg capsule Body Mass Index: Care Instructions Your Care Instructions Body mass index (BMI) can help you see if your weight is raising your risk for health problems. It uses a formula to compare how much you weigh with how tall you are. · A BMI lower than 18.5 is considered underweight. · A BMI between 18.5 and 24.9 is considered healthy. · A BMI between 25 and 29.9 is considered overweight. A BMI of 30 or higher is considered obese. If your BMI is in the normal range, it means that you have a lower risk for weight-related health problems. If your BMI is in the overweight or obese range, you may be at increased risk for weight-related health problems, such as high blood pressure, heart disease, stroke, arthritis or joint pain, and diabetes. If your BMI is in the underweight range, you may be at increased risk for health problems such as fatigue, lower protection (immunity) against illness, muscle loss, bone loss, hair loss, and hormone problems. BMI is just one measure of your risk for weight-related health problems. You may be at higher risk for health problems if you are not active, you eat an unhealthy diet, or you drink too much alcohol or use tobacco products. Follow-up care is a key part of your treatment and safety. Be sure to make and go to all appointments, and call your doctor if you are having problems. It's also a good idea to know your test results and keep a list of the medicines you take. How can you care for yourself at home? · Practice healthy eating habits. This includes eating plenty of fruits, vegetables, whole grains, lean protein, and low-fat dairy. · If your doctor recommends it, get more exercise.  Walking is a good choice. Bit by bit, increase the amount you walk every day. Try for at least 30 minutes on most days of the week. · Do not smoke. Smoking can increase your risk for health problems. If you need help quitting, talk to your doctor about stop-smoking programs and medicines. These can increase your chances of quitting for good. · Limit alcohol to 2 drinks a day for men and 1 drink a day for women. Too much alcohol can cause health problems. If you have a BMI higher than 25 · Your doctor may do other tests to check your risk for weight-related health problems. This may include measuring the distance around your waist. A waist measurement of more than 40 inches in men or 35 inches in women can increase the risk of weight-related health problems. · Talk with your doctor about steps you can take to stay healthy or improve your health. You may need to make lifestyle changes to lose weight and stay healthy, such as changing your diet and getting regular exercise. If you have a BMI lower than 18.5 · Your doctor may do other tests to check your risk for health problems. · Talk with your doctor about steps you can take to stay healthy or improve your health. You may need to make lifestyle changes to gain or maintain weight and stay healthy, such as getting more healthy foods in your diet and doing exercises to build muscle. Where can you learn more? Go to http://karlos-sammi.info/. Enter S176 in the search box to learn more about \"Body Mass Index: Care Instructions. \" Current as of: October 13, 2016 Content Version: 11.4 © 5306-2913 [employer]wise, [employer]. Care instructions adapted under license by [employer] (which disclaims liability or warranty for this information). If you have questions about a medical condition or this instruction, always ask your healthcare professional. [place] [address] any warranty or liability for your use of this information. Jusp Activation Thank you for requesting access to Jusp. Please follow the instructions below to securely access and download your online medical record. Jusp allows you to send messages to your doctor, view your test results, renew your prescriptions, schedule appointments, and more. How Do I Sign Up? 1. In your internet browser, go to [email or web address] 2. Click on the First Time User? Click Here link in the Sign In box. You will see the New Member Sign Up page. 3. Enter your Jusp Access Code exactly as it appears below. You will not need to use this code after youve completed the sign-up process. If you do not sign up before the expiration date, you must request a new code. Jusp Access Code: [email or web address] Expires: 2019 10:10 AM (This is the date your Jusp access code will ) 4. Enter the last four digits of your Social Security Number (xxxx) and Date of Birth (mm/dd/yyyy) as indicated and click Submit. You will be taken to the next sign-up page. 5. Create a Jusp ID. This will be your Jusp login ID and cannot be changed, so think of one that is secure and easy to remember. 6. Create a Jusp password. You can change your password at any time. 7. Enter your Password Reset Question and Answer. This can be used at a later time if you forget your password. 8. Enter your e-mail address. You will receive e-mail notification when new information is available in [address] 9. Click Sign Up. You can now view and download portions of your medical record. 10. Click the Download Summary menu link to download a portable copy of your medical information. Additional Information If you have questions, please visit the Frequently Asked Questions section of the Jusp website at [email or web address] Remember, Jusp is NOT to be used for urgent needs. For medical emergencies, dial 911.

## 2018-11-26 NOTE — PROGRESS NOTES
1. Have you been to the ER, urgent care clinic since your last visit? Hospitalized since your last visit? 
 
 no 
 
2. Have you seen or consulted any other health care providers outside of the [facility] since your last visit? Include any pap smears or colon screening. Yes, pcp 3. Since your last visit, have you had any of the following symptoms?  
 
  
 
 swelling feet 4. Have you had any blood work, X-rays or cardiac testing? 
 
  yes, [facility] 5.  Where do you normally have your labs drawn? [facility] 6. Do you need any refills today?    no

## 2018-11-26 NOTE — PROGRESS NOTES
HISTORY OF PRESENT ILLNESS Nathalia Shea is a 77 y.o. male. Left deltoid area pains off and on but likely positional as he quilts with extended arms; 5/16 resolved Hypertension The history is provided by the patient and medical records. This is a chronic problem. The problem has not changed since onset. Pertinent negatives include no chest pain and no shortness of breath. Cholesterol Problem The history is provided by the medical records. This is a chronic problem. Pertinent negatives include no chest pain and no shortness of breath. Follow Up Chronic Condition The history is provided by the medical records (cad). Pertinent negatives include no chest pain and no shortness of breath. Leg Swelling The history is provided by the patient. This is a new problem. The current episode started more than 1 week ago. The problem occurs daily. Pertinent negatives include no chest pain and no shortness of breath. The symptoms are aggravated by standing. The symptoms are relieved by sleep. Review of Systems Constitutional: Negative for chills, diaphoresis, fever, malaise/fatigue and weight loss. HENT: Negative for nosebleeds. Eyes: Negative for discharge. Respiratory: Negative for cough, shortness of breath and wheezing. Cardiovascular: Positive for leg swelling. Negative for chest pain, palpitations, orthopnea, claudication and PND. Gastrointestinal: Negative for diarrhea, nausea and vomiting. Genitourinary: Negative for dysuria and hematuria. Musculoskeletal: Negative for joint pain. Skin: Negative for rash. Neurological: Negative for dizziness, seizures and loss of consciousness. Endo/Heme/Allergies: Negative for polydipsia. Does not bruise/bleed easily. Psychiatric/Behavioral: Negative for depression and substance abuse. The patient does not have insomnia. Allergies Allergen Reactions  Pcn [Penicillins] Rash and Unknown (comments) Other reaction(s): unknown Family History Problem Relation Age of Onset  Heart Attack Father 54  
 Heart Disease Other Positive family history of heart disesae  Sudden Death Neg Hx Social History Tobacco Use  Smoking status: Never Smoker  Smokeless tobacco: Never Used Substance Use Topics  Alcohol use: No  
 Drug use: No  
  
 
Current Outpatient Medications Medication Sig  
 lisinopril (PRINIVIL, ZESTRIL) 5 mg tablet take 1 tablet by mouth once daily  bicalutamide (CASODEX) 50 mg tablet Take 1 Tab by mouth daily. Indications: metastatic prostate carcinoma  hydroCHLOROthiazide (HYDRODIURIL) 25 mg tablet Take 0.5 Tabs by mouth daily.  amLODIPine (NORVASC) 5 mg tablet take 1 tablet by mouth once daily  metoprolol (LOPRESSOR) 50 mg tablet Take  by mouth two (2) times a day.  simvastatin (ZOCOR) 20 mg tablet Take  by mouth nightly.  aspirin delayed-release 81 mg tablet Take  by mouth daily. No current facility-administered medications for this visit. No past surgical history on file. Diagnostic Studies: 
CARDIOLOGY STUDIES 2/10/2016 9/19/2013 EKG Result SR, WNL - Exercise Nuclear Stress Test Result - reduced capacity, fixed inf defect, nl EF Some recent data might be hidden Visit Vitals /66 Pulse 75 Ht 5' 9\" (1.753 m) Wt 93 kg (205 lb) BMI 30.27 kg/m² Mr. Abilio Sy has a reminder for a \"due or due soon\" health maintenance. I have asked that he contact his primary care provider for follow-up on this health maintenance. Physical Exam  
Constitutional: He is oriented to person, place, and time. He appears well-developed and well-nourished. No distress. obese HENT:  
Head: Normocephalic and atraumatic. Mouth/Throat: Normal dentition. Eyes: Right eye exhibits no discharge. Left eye exhibits no discharge. No scleral icterus. Neck: Neck supple. No JVD present. Carotid bruit is not present.  No thyromegaly present. Cardiovascular: Normal rate, regular rhythm, S1 normal, S2 normal, normal heart sounds and intact distal pulses. Exam reveals no gallop and no friction rub. No murmur heard. Pulmonary/Chest: Effort normal and breath sounds normal. He has no wheezes. He has no rales. Abdominal: Soft. He exhibits no mass. There is no tenderness. Musculoskeletal: He exhibits no edema. Lymphadenopathy:  
     Right cervical: No superficial cervical adenopathy present. Left cervical: No superficial cervical adenopathy present. Neurological: He is alert and oriented to person, place, and time. Skin: Skin is warm and dry. No rash noted. Psychiatric: He has a normal mood and affect. His behavior is normal.  
 
 
ASSESSMENT and PLAN Weight loss has been strongly encouraged by following dietary restrictions and an exercise routine. HLD:  
Lipid Complete Panel7/19/2018 [facility] Component Name Value Ref Range Cholesterol 156 110 - 200 mg/dL Triglyceride 76 40 - 149 mg/dL HDL 66 (H) 40 - 59 mg/dL Cholesterol/HDL 2.4 0.0 - 5.0 LDL CALCULATION 75 50 - 99 mg/dL VLDL CALCULATION 15 8 - 30 mg/dL CAD: 11/17; 11/16 stable symptoms; 2006 noncritical 
 
Patient is developing new edema which may be due to back issues that he has been having but mild incipient CHF cannot be ruled out. He is also due for his CAD surveillance stress test.  Check echo as well. Continue present medications for now. Diagnoses and all orders for this visit: 1. Atherosclerosis of native coronary artery of native heart without angina pectoris -     AMB POC EKG ROUTINE W/ 12 LEADS, INTER & REP 
-     ECHO ADULT COMPLETE; Future 
-     NUCLEAR CARDIAC STRESS TEST; Future 2. Essential hypertension, benign 3. Pure hypercholesterolemia 4. Obesity (BMI 30.0-34.9) 5. Edema, unspecified type 
-     ECHO ADULT COMPLETE; Future Pertinent laboratory and test data reviewed and discussed with patient. See patient instructions also for other medical advice given Medications Discontinued During This Encounter Medication Reason  methylPREDNISolone (MEDROL DOSEPACK) 4 mg tablet Therapy Completed  acetaminophen (TYLENOL) 650 mg TbER  celecoxib (CELEBREX) 200 mg capsule Follow-up Disposition: 
Return in about 3 months (around 2/26/2019), or if symptoms worsen or fail to improve, for post test.

## 2019-01-21 DIAGNOSIS — I10 HYPERTENSION, ESSENTIAL: ICD-10-CM

## 2019-01-21 RX ORDER — AMLODIPINE BESYLATE 5 MG/1
TABLET ORAL
Qty: 90 TAB | Refills: 3 | Status: SHIPPED | OUTPATIENT
Start: 2019-01-21 | End: 2020-02-06

## 2019-02-21 ENCOUNTER — OFFICE VISIT (OUTPATIENT)
Dept: CARDIOLOGY CLINIC | Age: 79
End: 2019-02-21

## 2019-02-21 VITALS
BODY MASS INDEX: 30.51 KG/M2 | SYSTOLIC BLOOD PRESSURE: 123 MMHG | WEIGHT: 206 LBS | DIASTOLIC BLOOD PRESSURE: 63 MMHG | HEIGHT: 69 IN | HEART RATE: 69 BPM

## 2019-02-21 DIAGNOSIS — E78.00 PURE HYPERCHOLESTEROLEMIA: ICD-10-CM

## 2019-02-21 DIAGNOSIS — I25.10 ATHEROSCLEROSIS OF NATIVE CORONARY ARTERY OF NATIVE HEART WITHOUT ANGINA PECTORIS: Primary | ICD-10-CM

## 2019-02-21 DIAGNOSIS — E66.9 OBESITY (BMI 30.0-34.9): ICD-10-CM

## 2019-02-21 DIAGNOSIS — I10 HYPERTENSION, ESSENTIAL: ICD-10-CM

## 2019-02-21 NOTE — PATIENT INSTRUCTIONS
There are no discontinued medications. Learning About Coronary Artery Disease (CAD)  What is coronary artery disease? Coronary artery disease (CAD) occurs when plaque builds up in the arteries that bring oxygen-rich blood to your heart. Plaque is a fatty substance made of cholesterol, calcium, and other substances in the blood. This process is called hardening of the arteries, or atherosclerosis. What happens when you have coronary artery disease? · Plaque may narrow the coronary arteries. Narrowed arteries cause poor blood flow. This can lead to angina symptoms such as chest pain or discomfort. If blood flow is completely blocked, you could have a heart attack. · You can slow CAD and reduce the risk of future problems by making changes in your lifestyle. These include quitting smoking and eating heart-healthy foods. · Treatments for CAD, along with changes in your lifestyle, can help you live a longer and healthier life. How can you prevent coronary artery disease? · Do not smoke. It may be the best thing you can do to prevent heart disease. If you need help quitting, talk to your doctor about stop-smoking programs and medicines. These can increase your chances of quitting for good. · Be active. Get at least 30 minutes of exercise on most days of the week. Walking is a good choice. You also may want to do other activities, such as running, swimming, cycling, or playing tennis or team sports. · Eat heart-healthy foods. Eat more fruits and vegetables and less foods that contain saturated and trans fats. Limit alcohol, sodium, and sweets. · Stay at a healthy weight. Lose weight if you need to. · Manage other health problems such as diabetes, high blood pressure, and high cholesterol. · Manage stress. Stress can hurt your heart. To keep stress low, talk about your problems and feelings. Don't keep your feelings hidden.   · If you have talked about it with your doctor, take a low-dose aspirin every day. Aspirin can help certain people lower their risk of a heart attack or stroke. But taking aspirin isn't right for everyone, because it can cause serious bleeding. Do not start taking daily aspirin unless your doctor knows about it. How is coronary artery disease treated? · Your doctor will suggest that you make lifestyle changes. For example, your doctor may ask you to eat healthy foods, quit smoking, lose extra weight, and be more active. · You will have to take medicines. · Your doctor may suggest a procedure to open narrowed or blocked arteries. This is called angioplasty. Or your doctor may suggest using healthy blood vessels to create detours around narrowed or blocked arteries. This is called bypass surgery. Follow-up care is a key part of your treatment and safety. Be sure to make and go to all appointments, and call your doctor if you are having problems. It's also a good idea to know your test results and keep a list of the medicines you take. Where can you learn more? Go to http://karlos-sammi.info/. Enter [phone] in the search box to learn more about \"Learning About Coronary Artery Disease (CAD). \"  Current as of: July 22, 2018  Content Version: 11.9  © 2450-4827 [employer]. Care instructions adapted under license by [employer] (which disclaims liability or warranty for this information). If you have questions about a medical condition or this instruction, always ask your healthcare professional. [place] [address] any warranty or liability for your use of this information. Rawporter Activation    Thank you for requesting access to Rawporter. Please follow the instructions below to securely access and download your online medical record. Rawporter allows you to send messages to your doctor, view your test results, renew your prescriptions, schedule appointments, and more. How Do I Sign Up? 1.  In your internet browser, go to [email or web address]. 2. Click on the First Time User? Click Here link in the Sign In box. You will see the New Member Sign Up page. 3. Enter your Primo Round Access Code exactly as it appears below. You will not need to use this code after youve completed the sign-up process. If you do not sign up before the expiration date, you must request a new code. Primo Round Access Code: [email or web address]  Expires: 2019  8:56 AM (This is the date your Primo Round access code will )    4. Enter the last four digits of your Social Security Number (xxxx) and Date of Birth (mm/dd/yyyy) as indicated and click Submit. You will be taken to the next sign-up page. 5. Create a Primo Round ID. This will be your Primo Round login ID and cannot be changed, so think of one that is secure and easy to remember. 6. Create a Primo Round password. You can change your password at any time. 7. Enter your Password Reset Question and Answer. This can be used at a later time if you forget your password. 8. Enter your e-mail address. You will receive e-mail notification when new information is available in [address] 9. Click Sign Up. You can now view and download portions of your medical record. 10. Click the Download Summary menu link to download a portable copy of your medical information. Additional Information    If you have questions, please visit the Frequently Asked Questions section of the Primo Round website at [email or web address] Remember, Primo Round is NOT to be used for urgent needs. For medical emergencies, dial 911.

## 2019-02-21 NOTE — PROGRESS NOTES
HISTORY OF PRESENT ILLNESS  Gia Hayes is a 77 y.o. male. 2/19 occasional left infra axillary chest pain which is positional.  Improves spontaneously and about 5 minutes. Left deltoid area pains off and on but likely positional as he quilts with extended arms; 5/16 resolved      Hypertension   The history is provided by the patient and medical records. This is a chronic problem. The problem has not changed since onset. Pertinent negatives include no chest pain and no shortness of breath. Cholesterol Problem   The history is provided by the medical records. This is a chronic problem. Pertinent negatives include no chest pain and no shortness of breath. Follow Up Chronic Condition   The history is provided by the medical records (cad). Pertinent negatives include no chest pain and no shortness of breath. Leg Swelling   The history is provided by the patient. This is a new problem. The current episode started more than 1 week ago. The problem occurs daily. The problem has been gradually improving. Pertinent negatives include no chest pain and no shortness of breath. The symptoms are aggravated by standing. The symptoms are relieved by sleep. Review of Systems   Constitutional: Negative for chills, diaphoresis, fever, malaise/fatigue and weight loss. HENT: Negative for nosebleeds. Eyes: Negative for discharge. Respiratory: Negative for cough, shortness of breath and wheezing. Cardiovascular: Positive for leg swelling. Negative for chest pain, palpitations, orthopnea, claudication and PND. Gastrointestinal: Negative for diarrhea, nausea and vomiting. Genitourinary: Negative for dysuria and hematuria. Musculoskeletal: Negative for joint pain. Skin: Negative for rash. Neurological: Negative for dizziness, seizures and loss of consciousness. Endo/Heme/Allergies: Negative for polydipsia. Does not bruise/bleed easily.    Psychiatric/Behavioral: Negative for depression and substance abuse. The patient does not have insomnia. Allergies   Allergen Reactions    Pcn [Penicillins] Rash and Unknown (comments)     Other reaction(s): unknown       Family History   Problem Relation Age of Onset    Heart Attack Father 54    Heart Disease Other         Positive family history of heart disesae    Sudden Death Neg Hx        Social History     Tobacco Use    Smoking status: Never Smoker    Smokeless tobacco: Never Used   Substance Use Topics    Alcohol use: No    Drug use: No        Current Outpatient Medications   Medication Sig    amLODIPine (NORVASC) 5 mg tablet take 1 tablet by mouth once daily    lisinopril (PRINIVIL, ZESTRIL) 5 mg tablet take 1 tablet by mouth once daily    bicalutamide (CASODEX) 50 mg tablet Take 1 Tab by mouth daily. Indications: metastatic prostate carcinoma    hydroCHLOROthiazide (HYDRODIURIL) 25 mg tablet Take 0.5 Tabs by mouth daily.  metoprolol (LOPRESSOR) 50 mg tablet Take  by mouth two (2) times a day.  simvastatin (ZOCOR) 20 mg tablet Take  by mouth nightly.  aspirin delayed-release 81 mg tablet Take  by mouth daily. No current facility-administered medications for this visit. History reviewed. No pertinent surgical history. Diagnostic Studies:  CARDIOLOGY STUDIES 2/10/2016 9/19/2013   EKG Result SR, WNL -   Exercise Nuclear Stress Test Result - reduced capacity, fixed inf defect, nl EF   Some recent data might be hidden   12/18 echo   interpretation Summary     · Estimated left ventricular ejection fraction is 56 - 60%. Normal left ventricular wall motion, no regional wall motion abnormality noted. · Normal right ventricular size and function. · Trace mitral valve regurgitation. · Mild tricuspid valve regurgitation is present. There is no evidence of pulmonary hypertension. · Mild pulmonic valve regurgitation is present. · Trivial pericardial effusion with fat pad noted.       Comparison Study Information     Prior Study There is a prior study available for comparison that was performed on 1/30/2009. As compared to the previous study, there are no significant changes. 12/18 Nuclear Stress Test     Negative myocardial perfusion imaging. Myocardial perfusion imaging supports a low risk stress test.   There is a prior study available for comparison. Interpretation Summary     · Baseline ECG: Sinus rhythm. · Negative stress electrocardiogram.  · Gated SPECT: Left ventricular function post-stress was normal. Calculated ejection fraction is 90%. There is no evidence of transient ischemic dilation (TID). The TID ratio is 1.01.  · Left ventricular perfusion is normal.  · Negative myocardial perfusion imaging. Myocardial perfusion imaging supports a low risk stress test.  · Myocardial perfusion imaging defect 1: There is a defect that is small in size with a mild reduction in uptake present in the inferior location(s) that is non-reversible. There is normal wall motion in the defect area. Viability in the area is good. The defect appears to be an artifact caused by subdiaphragmatic activity. Visit Vitals  /63   Pulse 69   Ht 5' 9\" (1.753 m)   Wt 93.4 kg (206 lb)   BMI 30.42 kg/m²       Mr. Yareli Ferrer has a reminder for a \"due or due soon\" health maintenance. I have asked that he contact his primary care provider for follow-up on this health maintenance. Physical Exam   Constitutional: He is oriented to person, place, and time. He appears well-developed and well-nourished. No distress. obese   HENT:   Head: Normocephalic and atraumatic. Mouth/Throat: Normal dentition. Eyes: Right eye exhibits no discharge. Left eye exhibits no discharge. No scleral icterus. Neck: Neck supple. No JVD present. Carotid bruit is not present. No thyromegaly present. Cardiovascular: Normal rate, regular rhythm, S1 normal, S2 normal, normal heart sounds and intact distal pulses. Exam reveals no gallop and no friction rub.    No murmur heard. Pulmonary/Chest: Effort normal and breath sounds normal. He has no wheezes. He has no rales. Abdominal: Soft. He exhibits no mass. There is no tenderness. Musculoskeletal: He exhibits no edema. Lymphadenopathy:        Right cervical: No superficial cervical adenopathy present. Left cervical: No superficial cervical adenopathy present. Neurological: He is alert and oriented to person, place, and time. Skin: Skin is warm and dry. No rash noted. Psychiatric: He has a normal mood and affect. His behavior is normal.       ASSESSMENT and PLAN    Weight loss has been strongly encouraged by following dietary restrictions and an exercise routine. HLD:   Lipid Complete Panel7/19/2018  [facility]  Component Name Value Ref Range   Cholesterol 156 110 - 200 mg/dL   Triglyceride 76 40 - 149 mg/dL   HDL 66 (H) 40 - 59 mg/dL   Cholesterol/HDL 2.4 0.0 - 5.0    LDL CALCULATION 75 50 - 99 mg/dL   VLDL CALCULATION 15 8 - 30 mg/dL         CAD: 11/17; 11/16 stable symptoms; 2006 noncritical    CAD stable. Found trace MR and echo which is not clinically significant. Continue present medications. Diet weight and exercise was discussed. Mild edema that he gets is most likely related to his back or hip problems and was advised to use compression stockings. Diagnoses and all orders for this visit:    1. Atherosclerosis of native coronary artery of native heart without angina pectoris    2. Hypertension, essential    3. Pure hypercholesterolemia    4. Obesity (BMI 30.0-34. 9)        Pertinent laboratory and test data reviewed and discussed with patient. See patient instructions also for other medical advice given    There are no discontinued medications. Follow-up Disposition:  Return in about 1 year (around 2/21/2020), or if symptoms worsen or fail to improve, for with ekg.

## 2019-02-21 NOTE — PROGRESS NOTES
1. Have you been to the ER, urgent care clinic since your last visit? Hospitalized since your last visit? No     2. Have you seen or consulted any other health care providers outside of the [address] [place] [address] since your last visit? Include any pap smears or colon screening. Yes Where: PCP     3. Since your last visit, have you had any of the following symptoms? .           4. Have you had any blood work, X-rays or cardiac testing? No        5. Where do you normally have your labs drawn? [facility]    6. Do you need any refills today?    No

## 2019-02-21 NOTE — LETTER
Erica David 1940 2/21/2019 Dear MD Betina Hoang MD 
 
I had the pleasure of evaluating  Mr. Oliver Mae in office today. Below are the relevant portions of my assessment and plan of care. ICD-10-CM ICD-9-CM 1. Atherosclerosis of native coronary artery of native heart without angina pectoris I25.10 414.01   
2. Hypertension, essential I10 401.9 3. Pure hypercholesterolemia E78.00 272.0   
4. Obesity (BMI 30.0-34. 9) E66.9 278.00 Current Outpatient Medications Medication Sig Dispense Refill  amLODIPine (NORVASC) 5 mg tablet take 1 tablet by mouth once daily 90 Tab 3  
 lisinopril (PRINIVIL, ZESTRIL) 5 mg tablet take 1 tablet by mouth once daily 30 Tab 6  
 bicalutamide (CASODEX) 50 mg tablet Take 1 Tab by mouth daily. Indications: metastatic prostate carcinoma 90 Tab 3  
 hydroCHLOROthiazide (HYDRODIURIL) 25 mg tablet Take 0.5 Tabs by mouth daily. 30 Tab 3  
 metoprolol (LOPRESSOR) 50 mg tablet Take  by mouth two (2) times a day.  simvastatin (ZOCOR) 20 mg tablet Take  by mouth nightly.  aspirin delayed-release 81 mg tablet Take  by mouth daily. No orders of the defined types were placed in this encounter. If you have questions, please do not hesitate to call me. I look forward to following Mr. Oliver Mae along with you. Sincerely, Joe Linda MD

## 2019-03-22 ENCOUNTER — HOSPITAL ENCOUNTER (OUTPATIENT)
Dept: LAB | Age: 79
Discharge: HOME | End: 2019-03-22
Payer: MEDICARE

## 2019-03-22 ENCOUNTER — LAB ONLY (OUTPATIENT)
Dept: UROLOGY | Age: 79
End: 2019-03-22

## 2019-03-22 DIAGNOSIS — C61 PROSTATE CANCER (HCC): Primary | ICD-10-CM

## 2019-03-22 DIAGNOSIS — C61 PROSTATE CANCER (HCC): ICD-10-CM

## 2019-03-22 LAB — PSA SERPL-MCNC: 0 NG/ML (ref 0–4)

## 2019-03-22 PROCEDURE — 84153 ASSAY OF PSA TOTAL: CPT

## 2019-04-22 ENCOUNTER — CLINICAL SUPPORT (OUTPATIENT)
Dept: UROLOGY | Age: 79
End: 2019-04-22

## 2019-04-22 VITALS
HEART RATE: 73 BPM | OXYGEN SATURATION: 100 % | BODY MASS INDEX: 30.42 KG/M2 | SYSTOLIC BLOOD PRESSURE: 120 MMHG | DIASTOLIC BLOOD PRESSURE: 66 MMHG | HEIGHT: 69 IN

## 2019-04-22 DIAGNOSIS — C61 PROSTATE CANCER (HCC): Primary | ICD-10-CM

## 2019-04-22 LAB
BILIRUB UR QL STRIP: NEGATIVE
GLUCOSE UR-MCNC: NEGATIVE MG/DL
KETONES P FAST UR STRIP-MCNC: NEGATIVE MG/DL
PH UR STRIP: 6 [PH] (ref 4.6–8)
PROT UR QL STRIP: NEGATIVE
SP GR UR STRIP: 1.01 (ref 1–1.03)
UA UROBILINOGEN AMB POC: NORMAL (ref 0.2–1)
URINALYSIS CLARITY POC: CLEAR
URINALYSIS COLOR POC: YELLOW
URINE BLOOD POC: NEGATIVE
URINE LEUKOCYTES POC: NEGATIVE
URINE NITRITES POC: NEGATIVE

## 2019-04-22 NOTE — PROGRESS NOTES
Mr. Ambika Yao has a reminder for a \"due or due soon\" health maintenance. I have asked that he contact his primary care provider for follow-up on this health maintenance.

## 2019-04-22 NOTE — PATIENT INSTRUCTIONS
Leuprolide (By injection) Leuprolide ([id number]) Treats endometriosis, uterine fibroids, and premature puberty. Also treats symptoms of prostate cancer. Brand Name(s): Eligard, Lupaneta Pack, Lupron Depot, Lupron Depot-Ped There may be other brand names for this medicine. When This Medicine Should Not Be Used: This medicine is not right for everyone. You should not receive it if you had an allergic reaction to leuprolide or similar medicines, or if you are pregnant or breastfeeding. How to Use This Medicine:  
Injectable · Your doctor will prescribe your exact dose and schedule. This medicine is given as a shot into a muscle or under the skin. Leuprolide injection is given on different schedules for different conditions. It might be given every day, once a month, or every few months. · A nurse or other health provider will give you this medicine. · You may be taught how to give your medicine at home. Make sure you understand all instructions before giving yourself an injection. Do not use more medicine or use it more often than your doctor tells you to. · You will be shown the body areas where this shot can be given. Use a different body area each time you give yourself a shot. Keep track of where you give each shot to make sure you rotate body areas. · Use a new needle and syringe each time you inject your medicine. · This medicine should come with a Medication Guide. Ask your pharmacist for a copy if you do not have one. · Read and follow the patient instructions that come with this medicine. Talk to your doctor or pharmacist if you have any questions. · Missed dose: This medicine needs to be given on a fixed schedule. If you miss a dose, call your doctor, home health caregiver, or treatment clinic for instructions. · If you store this medicine at home, keep it at room temperature, away from heat, moisture, and direct light. Drugs and Foods to Avoid: Ask your doctor or pharmacist before using any other medicine, including over-the-counter medicines, vitamins, and herbal products. · Some medicines can affect how leuprolide works. Tell your doctor if you are using any of the following: ¨ Medicine to treat depression (including bupropion) ¨ Medicine to treat heart rhythm problems ¨ Medicine to treat seizures ¨ Steroid medicine (including dexamethasone, hydrocortisone, methylprednisolone, prednisolone, prednisone) Warnings While Using This Medicine: · It is not safe to take this medicine during pregnancy. It could harm an unborn baby. Tell your doctor right away if you become pregnant. Women who are using this medicine should use birth control that does not contain hormones. · Tell your doctor if you have kidney disease, heart failure, diabetes, heart or blood vessel disease, heart rhythm problems (including long QT syndrome), problems with your nervous system, or a history of brain tumor, depression, mental illness, seizures, or stroke. · Women: Your menstrual periods should stop, but you might have light bleeding or spotting. If you continue to have heavy bleeding or regular periods, call your doctor. · This medicine may cause the following problems: 
¨ Changes in mood or behavior ¨ Increased risk for seizures ¨ Heart rhythm changes ¨ Weaker bones, which may lead to osteoporosis ¨ Problems with the urinary tract or spinal cord (in men) ¨ Changes in blood sugar levels (in men) ¨ Higher risk of heart attack or stroke (in men) · Your symptoms might get worse when you first start using this medicine, but they should get better as the medicine starts to work. If your condition does not begin to improve after 2 weeks, check with your doctor. · Tell any doctor or dentist who treats you that you are using this medicine. This medicine may affect certain medical test results.  
· Your doctor will check your progress and the effects of this medicine at regular visits. Keep all appointments. · Keep all medicine out of the reach of children. Never share your medicine with anyone. Possible Side Effects While Using This Medicine:  
Call your doctor right away if you notice any of these side effects: · Allergic reaction: Itching or hives, swelling in your face or hands, swelling or tingling in your mouth or throat, chest tightness, trouble breathing · Change in how much or how often you urinate · Depression, mood or behavior changes · Fast, pounding, or uneven heartbeat · Heavy vaginal bleeding · Seizures · Unusual or severe bone or back pain If you notice these less serious side effects, talk with your doctor:  
· Headache · Hot flashes and sweating, warmth or redness in your face, neck, arms, or upper chest 
· Loss of interest in sex, sexual problems · Pain, itching, burning, bruises, or swelling where the shot was given If you notice other side effects that you think are caused by this medicine, tell your doctor. Call your doctor for medical advice about side effects. You may report side effects to FDA at 3-238-FDA-0813 © 2017 [facility] Information is for End User's use only and may not be sold, redistributed or otherwise used for commercial purposes. The above information is an  only. It is not intended as medical advice for individual conditions or treatments. Talk to your doctor, nurse or pharmacist before following any medical regimen to see if it is safe and effective for you.

## 2019-04-23 NOTE — PROGRESS NOTES
Phillip Rodriguez [de-identified] y.o. male    Prostate Carcinoma [address] Histology/PSA 4.4/IM RT 2013 
  
  
 Mr. Adalberto Bennett seen today for prostate carcinoma follow-up-here today for maintenance of androgen ablation therapy with Lupron injection having started Casodex treatme in October 2018nt  
stable low-level PSA elevation during the past 2 years with most recent PSA in  August 2018 showing rise to 2.6-here today for results of bone scan metastatic survey Patient has no complaints of bone pain no voiding symptoms at this time 
  
Bone scan on 19 September 2018- for signs of bony metastases-images have been reviewed with the patient today on PACS laser printed for the patient 
  
PSA 1.2 in August 2014 
 PSA 0.7 in August 2015 PSA 1.0 in August 2016 PSA 1.2 in August 2017  
PSA 2.6 in August 2018                       Lupron/Casodex Rx initiated October 2018 PSA 0.0 in March 2019 
  
Review of Systems:  
CNS: No seizure syncope headaches dizziness or visual changes Respiratory-no wheezing no shortness of breath Cardiovascular:hypertension-no chest pains no palpitations Intestinal:negative no dyspepsia diarrhea or constipation Urinary: prostate carcinoma 2013 Skeletal: No large joint pain or muscle complaints Endocrine:negative diabetes Other: 
  
Allergies: Allergies Allergen Reactions  Pcn [Penicillins] Rash and Unknown (comments) Other reaction(s): unknown Medications:   
Current Outpatient Medications Medication Sig Dispense Refill  leuprolide depot (LUPRON 6 MONTH) 45 mg injection 1 Each by IntraMUSCular route once for 1 dose. 1 Each 0  
 amLODIPine (NORVASC) 5 mg tablet take 1 tablet by mouth once daily 90 Tab 3  
 lisinopril (PRINIVIL, ZESTRIL) 5 mg tablet take 1 tablet by mouth once daily 30 Tab 6  
 bicalutamide (CASODEX) 50 mg tablet Take 1 Tab by mouth daily.  Indications: metastatic prostate carcinoma 90 Tab 3  
 hydroCHLOROthiazide (HYDRODIURIL) 25 mg tablet Take 0.5 Tabs by mouth daily. 30 Tab 3  
 metoprolol (LOPRESSOR) 50 mg tablet Take  by mouth two (2) times a day.  simvastatin (ZOCOR) 20 mg tablet Take  by mouth nightly.  aspirin delayed-release 81 mg tablet Take  by mouth daily. Past Medical History:  
Diagnosis Date  Cancer of prostate ([facility] [address] 4/2012  
 radiation- Dr Keegan Funes  Essential hypertension  Gout  Obesity, unspecified Weight loss has been strongly encouraged by following dietary restrictions and an exercise routine.  Other and unspecified hyperlipidemia 1/12 Low density lipoproteins (LDLs) are at goal, triglycerides are at goal, high density lipoproteins (HDLs) are at goal.  
  
History reviewed. No pertinent surgical history. Social History Socioeconomic History  Marital status:  Spouse name: Not on file  Number of children: Not on file  Years of education: Not on file  Highest education level: Not on file Occupational History  Not on file Social Needs  Financial resource strain: Not on file  Food insecurity:  
  Worry: Not on file Inability: Not on file  Transportation needs:  
  Medical: Not on file Non-medical: Not on file Tobacco Use  Smoking status: Never Smoker  Smokeless tobacco: Never Used Substance and Sexual Activity  Alcohol use: No  
 Drug use: No  
 Sexual activity: Not on file Lifestyle  Physical activity:  
  Days per week: Not on file Minutes per session: Not on file  Stress: Not on file Relationships  Social connections:  
  Talks on phone: Not on file Gets together: Not on file Attends [employer] service: Not on file Active member of club or organization: Not on file Attends meetings of clubs or organizations: Not on file Relationship status: Not on file  Intimate partner violence:  
  Fear of current or ex partner: Not on file Emotionally abused: Not on file Physically abused: Not on file Forced sexual activity: Not on file Other Topics Concern  Not on file Social History Narrative  Not on file Family History Problem Relation Age of Onset  Heart Attack Father 54  
 Heart Disease Other Positive family history of heart disesae  Sudden Death Neg Hx Physical Examination: Well-nourished mature male in no apparent distress Urinalysis: Negative dipstick/nitrite negative Impression: stage D 1 Prostate carcinoma responding favorably to androgen ablation therapy with Lupron/Casodex Plan: Lupron 45 mg IM right buttock today continue Casodex 50 mg daily RTC 6 months-Lupron injection More than 1/2 of this 15 minute visit was spent in counselling and coordination of care, as described above. Kaylin Morrison MD 
-electronically signed- 
 
PLEASE NOTE: 
This document has been produced using voice recognition software. Unrecognized errors in transcription may be present.

## 2019-05-17 DIAGNOSIS — I10 ESSENTIAL HYPERTENSION, BENIGN: ICD-10-CM

## 2019-05-17 RX ORDER — LISINOPRIL 5 MG/1
TABLET ORAL
Qty: 30 TAB | Refills: 6 | Status: SHIPPED | OUTPATIENT
Start: 2019-05-17 | End: 2019-11-14 | Stop reason: SDUPTHER

## 2019-05-20 RX ORDER — HYDROCHLOROTHIAZIDE 25 MG/1
TABLET ORAL
Qty: 30 TAB | Refills: 3 | Status: SHIPPED | OUTPATIENT
Start: 2019-05-20 | End: 2020-01-08

## 2019-09-15 DIAGNOSIS — C61 PROSTATE CANCER (HCC): ICD-10-CM

## 2019-09-17 RX ORDER — BICALUTAMIDE 50 MG/1
TABLET, FILM COATED ORAL
Qty: 90 TAB | Refills: 3 | Status: SHIPPED | OUTPATIENT
Start: 2019-09-17 | End: 2020-08-12

## 2019-10-10 ENCOUNTER — HOSPITAL ENCOUNTER (OUTPATIENT)
Dept: LAB | Age: 79
Discharge: HOME | End: 2019-10-10
Payer: MEDICARE

## 2019-10-10 DIAGNOSIS — C61 PROSTATE CANCER (HCC): Primary | ICD-10-CM

## 2019-10-10 DIAGNOSIS — C61 PROSTATE CANCER (HCC): ICD-10-CM

## 2019-10-10 LAB — PSA SERPL-MCNC: 0 NG/ML (ref 0–4)

## 2019-10-10 PROCEDURE — 36415 COLL VENOUS BLD VENIPUNCTURE: CPT

## 2019-10-10 PROCEDURE — 84153 ASSAY OF PSA TOTAL: CPT

## 2019-10-21 ENCOUNTER — CLINICAL SUPPORT (OUTPATIENT)
Dept: UROLOGY | Age: 79
End: 2019-10-21

## 2019-10-21 VITALS
OXYGEN SATURATION: 99 % | DIASTOLIC BLOOD PRESSURE: 64 MMHG | BODY MASS INDEX: 31.99 KG/M2 | WEIGHT: 216 LBS | HEART RATE: 79 BPM | HEIGHT: 69 IN | SYSTOLIC BLOOD PRESSURE: 120 MMHG

## 2019-10-21 DIAGNOSIS — C61 PROSTATE CA (HCC): Primary | ICD-10-CM

## 2019-10-21 NOTE — PROGRESS NOTES
Rachel Jet [de-identified] y.o. male   Prostate Carcinoma [address] Histology/PSA 4.4/IM RT 2013         Mr. Hines seen today for prostate carcinoma follow-up-patient is doing well on androgen ablation therapy with Lupron/Casodex    here today for maintenance of androgen ablation therapy with Lupron injection having started Casodex treatme in October 2018   stable low-level PSA elevation during the past 2 years with most recent PSA in  August 2018 showing rise to 2.6-here today for results of bone scan metastatic survey  Patient has no complaints of bone pain no voiding symptoms at this time     Bone scan on 19 September 2018- for signs of bony metastases-images have been reviewed with the patient today on PACS laser printed for the patient     PSA 1.2 in August 2014   PSA 0.7 in August 2015  PSA 1.0 in August 2016  PSA 1.2 in August 2017                       bone scan negative in September 2018  PSA 2.6 in August 2018                       Lupron/Casodex Rx initiated October 2018  PSA 0.0 in March 2019  PSA 0.0 on 10 October 2019     Review of Systems:   CNS: No seizure syncope headaches dizziness or visual changes  Respiratory-no wheezing no shortness of breath  Cardiovascular:hypertension-no chest pains no palpitations  Intestinal:negative no dyspepsia diarrhea or constipation  Urinary: prostate carcinoma 2013  Skeletal: No large joint pain or muscle complaints  Endocrine:negative diabetes  Other:      Allergies: Allergies   Allergen Reactions    Pcn [Penicillins] Rash and Unknown (comments)     Other reaction(s): unknown      Medications:    Current Outpatient Medications   Medication Sig Dispense Refill    leuprolide depot (LUPRON DEPOT, 6 MONTH,) 45 mg injection 45 mg by IntraMUSCular route once.  leuprolide depot (LUPRON 6 MONTH) 45 mg injection 1 Each by IntraMUSCular route once for 1 dose.  1 Each 0    bicalutamide (CASODEX) 50 mg tablet take 1 tablet by mouth once daily 90 Tab 3    hydroCHLOROthiazide (HYDRODIURIL) 25 mg tablet take 1/2 tablet by mouth once daily 30 Tab 3    lisinopril (PRINIVIL, ZESTRIL) 5 mg tablet TAKE ONE TABLET BY MOUTH ONCE DAILY 30 Tab 6    metoprolol (LOPRESSOR) 50 mg tablet Take  by mouth two (2) times a day.  simvastatin (ZOCOR) 20 mg tablet Take  by mouth nightly.  aspirin delayed-release 81 mg tablet Take  by mouth daily.  amLODIPine (NORVASC) 5 mg tablet take 1 tablet by mouth once daily 90 Tab 3       Past Medical History:   Diagnosis Date    Cancer of prostate ([facility] [address] 4/2012    radiation- Dr Lane Mcintyre Essential hypertension     Gout     Obesity, unspecified     Weight loss has been strongly encouraged by following dietary restrictions and an exercise routine.  Other and unspecified hyperlipidemia     1/12 Low density lipoproteins (LDLs) are at goal, triglycerides are at goal, high density lipoproteins (HDLs) are at goal.      History reviewed. No pertinent surgical history.   Social History     Socioeconomic History    Marital status:      Spouse name: Not on file    Number of children: Not on file    Years of education: Not on file    Highest education level: Not on file   Occupational History    Not on file   Social Needs    Financial resource strain: Not on file    Food insecurity:     Worry: Not on file     Inability: Not on file    Transportation needs:     Medical: Not on file     Non-medical: Not on file   Tobacco Use    Smoking status: Never Smoker    Smokeless tobacco: Never Used   Substance and Sexual Activity    Alcohol use: No    Drug use: No    Sexual activity: Not on file   Lifestyle    Physical activity:     Days per week: Not on file     Minutes per session: Not on file    Stress: Not on file   Relationships    Social connections:     Talks on phone: Not on file     Gets together: Not on file     Attends Worship service: Not on file     Active member of club or organization: Not on file     Attends meetings of clubs or organizations: Not on file     Relationship status: Not on file    Intimate partner violence:     Fear of current or ex partner: Not on file     Emotionally abused: Not on file     Physically abused: Not on file     Forced sexual activity: Not on file   Other Topics Concern    Not on file   Social History Narrative    Not on file      Family History   Problem Relation Age of Onset    Heart Attack Father 54    Heart Disease Other         Positive family history of heart disesae    Sudden Death Neg Hx         Physical Examination: Well-nourished mature male in no apparent distress      Urinalysis: Negative dipstick/nitrite negative/heme-negative    Impression: Prostate carcinoma biochemically active 6 years following radiation therapy responding favorably to androgen ablation therapy with Lupron/Casodex      Plan: Lupron 45 mg IM right buttock today               Continue Casodex 50 mg daily    RTC 6 months PSA Lupron Rx      More than 1/2 of this 15 minute visit was spent in counselling and coordination of care, as described above. Aadm Mas MD  -electronically signed-    PLEASE NOTE:  This document has been produced using voice recognition software. Unrecognized errors in transcription may be present.

## 2019-10-21 NOTE — PATIENT INSTRUCTIONS
Prostate Biopsy: About This Test  What is it? A prostate biopsy is a type of test. Your doctor takes small tissue samples from your prostate gland. Then another doctor looks at the tissue under a microscope to see if there are cancer cells. This test is done by a doctor who specializes in men's genital and urinary problems (urologist). It can be done in your doctor's office, a day surgery clinic, or a hospital operating room. To get the tissue samples from the prostate, the doctor inserts a thin needle through the rectum, the urethra, or the area between the anus and scrotum (perineum). The most common method is through the rectum. Your doctor may use ultrasound to help guide the needle. Why is this test done? You may need a prostate biopsy if your doctor found something of concern during a digital rectal exam. Or you may need it if a blood test showed a high level of prostate-specific antigen (PSA). A biopsy can help find out if you have prostate cancer. How can you prepare for this test?  Before you have a prostate biopsy, tell your doctor if you are taking any medicines or using any herbal supplements, or if you are allergic to any medicines. And tell your doctor if you have had bleeding problems, or you take aspirin or some other blood thinner. What happens before the test?  · You may need to have an enema before the test.  · You will need to take off all or most of your clothes. You will be given a cloth or paper gown to use during the test.  · You may be given a sedative through a vein (IV) in your arm. The sedative will help you relax and stay still. What happens during the test?  Some men have an MRI of the prostate before their biopsy. This helps to find the areas in the prostate to take biopsy samples. If you have an MRI, your doctor will use ultrasound and the MRI results to find the areas to biopsy.   Through the rectum  · You may be asked to kneel, lie on your side, or lie on your back.  · Your doctor may inject an anesthetic around the prostate to numb the area before the sample is taken. · Ultrasound is often used to guide the needle to the correct spot. · Your doctor may choose to use a needle guide for the biopsy. He or she will attach the guide to a finger. Your doctor will insert the finger into your rectum. · The needle will enter the prostate and take 6 to 12 samples. Through the urethra  · You will lie on your back. Your feet will rest in stirrups. · You will get anesthesia. The anesthesia may make you sleep. Or it may just numb the area being worked on.  · Your doctor will insert a lighted scope (cystoscope) into your urethra. The scope allows your doctor to look directly at the prostate. Your doctor will pass a cutting loop through the scope to remove samples of prostate tissue. Through the perineum  · You will lie on an exam table either on your side or on your back with your knees bent. You will get anesthesia. The anesthesia may make you sleep. Or it may just numb the area being worked on.  · Your doctor will make a small cut in your perineum. Then he or she will insert a finger into the rectum to hold the prostate. · Your doctor will then insert the needle through the cut and into the prostate. · The needle collects samples of tissue and is then pulled out. What else should you know about this test?  · A prostate biopsy has a slight risk of causing problems such as infection or bleeding. · If the biopsy went through your rectum, you may have a small amount of bleeding from your rectum for 2 to 3 days after the biopsy. · You may have a little pain in your pelvic area. You may also have a little blood in your urine for 1 to 5 days. · You may have some blood in your semen for a week or longer. How long will the test take? This test will take about 15 to 45 minutes.   What happens after the test?  Your doctor will tell you what to expect and watch for after your test. In general:  · If you have anesthesia that makes you sleep, you will be in a recovery room for a few hours. You will need someone to drive you home. You may feel tired for the rest of the day. · You will probably be able to go home right away. · If your doctor had you stop taking any medicine for the biopsy, ask him or her when you can start taking it again. If you were given antibiotics, take them as directed. · Do not do heavy work or exercise for 4 hours after the test.  · Your doctor will tell you how long it may take to get your results back. Follow-up care is a key part of your treatment and safety. Be sure to make and go to all appointments, and call your doctor if you are having problems. It's also a good idea to keep a list of the medicines you take. Ask your doctor when you can expect to have your test results. Where can you learn more? Go to http://karlos-sammi.info/. Enter Y504 in the search box to learn more about \"Prostate Biopsy: About This Test.\"  Current as of: December 19, 2018  Content Version: 12.2  © 3073-7907 [employer]. Care instructions adapted under license by [employer] (which disclaims liability or warranty for this information). If you have questions about a medical condition or this instruction, always ask your healthcare professional. [address] any warranty or liability for your use of this information.

## 2019-10-21 NOTE — PROGRESS NOTES
Mr. Paola Meléndez has a reminder for a \"due or due soon\" health maintenance. I have asked that he contact his primary care provider for follow-up on this health maintenance.

## 2019-11-14 DIAGNOSIS — I10 ESSENTIAL HYPERTENSION, BENIGN: ICD-10-CM

## 2019-11-15 RX ORDER — LISINOPRIL 5 MG/1
TABLET ORAL
Qty: 90 TAB | Refills: 1 | Status: SHIPPED | OUTPATIENT
Start: 2019-11-15 | End: 2020-02-12

## 2019-11-29 LAB
ANION GAP SERPL CALC-SCNC: 14.6 MMOL/L
BUN SERPL-MCNC: 14 MG/DL (ref 6–22)
CALCIUM SERPL-MCNC: 9.7 MG/DL (ref 8.4–10.5)
CHLORIDE SERPL-SCNC: 98 MMOL/L (ref 98–110)
CO2 SERPL-SCNC: 25 MMOL/L (ref 20–32)
CREAT SERPL-MCNC: 0.8 MG/DL (ref 0.8–1.6)
GFRAA, 66117: >60
GFRNA, 66118: >60
GLUCOSE SERPL-MCNC: 89 MG/DL (ref 70–99)
MAGNESIUM SERPL-MCNC: 2 MG/DL (ref 1.6–2.5)
POTASSIUM SERPL-SCNC: 3.6 MMOL/L (ref 3.5–5.5)
SODIUM SERPL-SCNC: 138 MMOL/L (ref 133–145)

## 2020-01-06 DIAGNOSIS — I25.10 CORONARY ARTERY DISEASE INVOLVING NATIVE CORONARY ARTERY OF NATIVE HEART WITHOUT ANGINA PECTORIS: Primary | ICD-10-CM

## 2020-01-08 RX ORDER — HYDROCHLOROTHIAZIDE 25 MG/1
TABLET ORAL
Qty: 30 TAB | Refills: 3 | Status: SHIPPED | OUTPATIENT
Start: 2020-01-08 | End: 2020-09-04

## 2020-02-06 ENCOUNTER — OFFICE VISIT (OUTPATIENT)
Dept: CARDIOLOGY CLINIC | Age: 80
End: 2020-02-06

## 2020-02-06 VITALS
HEIGHT: 69 IN | SYSTOLIC BLOOD PRESSURE: 128 MMHG | DIASTOLIC BLOOD PRESSURE: 62 MMHG | WEIGHT: 219 LBS | HEART RATE: 69 BPM | BODY MASS INDEX: 32.44 KG/M2

## 2020-02-06 DIAGNOSIS — I10 ESSENTIAL HYPERTENSION, BENIGN: ICD-10-CM

## 2020-02-06 DIAGNOSIS — E66.9 OBESITY (BMI 30.0-34.9): ICD-10-CM

## 2020-02-06 DIAGNOSIS — E78.00 PURE HYPERCHOLESTEROLEMIA: ICD-10-CM

## 2020-02-06 DIAGNOSIS — I25.10 ATHEROSCLEROSIS OF NATIVE CORONARY ARTERY OF NATIVE HEART WITHOUT ANGINA PECTORIS: Primary | ICD-10-CM

## 2020-02-06 RX ORDER — DEXTROMETHORPHAN HYDROBROMIDE, GUAIFENESIN 5; 100 MG/5ML; MG/5ML
650 LIQUID ORAL EVERY 8 HOURS
COMMUNITY

## 2020-02-06 NOTE — PROGRESS NOTES
HISTORY OF PRESENT ILLNESS  Lavell Marcano is a [de-identified] y.o. male. 2/2020 occasional left side chest pain with shoulder pain which is positional and resolves with repositiioning. 2/19 occasional left infra axillary chest pain which is positional.  Improves spontaneously and about 5 minutes. Left deltoid area pains off and on but likely positional as he quilts with extended arms; 5/16 resolved    Hypertension   The history is provided by the patient and medical records. This is a chronic problem. The problem has not changed since onset. Pertinent negatives include no chest pain and no shortness of breath. Cholesterol Problem   The history is provided by the medical records. This is a chronic problem. Pertinent negatives include no chest pain and no shortness of breath. Follow Up Chronic Condition   The history is provided by the medical records (cad). Pertinent negatives include no chest pain and no shortness of breath. Leg Swelling   The history is provided by the patient. This is a new problem. The current episode started more than 1 week ago. The problem occurs daily. The problem has been resolved. Pertinent negatives include no chest pain and no shortness of breath. The symptoms are aggravated by standing. The symptoms are relieved by sleep. Review of Systems   Constitutional: Negative for chills, diaphoresis, fever, malaise/fatigue and weight loss. HENT: Negative for nosebleeds. Eyes: Negative for discharge. Respiratory: Negative for cough, shortness of breath and wheezing. Cardiovascular: Negative for chest pain, palpitations, orthopnea, claudication, leg swelling and PND. Gastrointestinal: Negative for diarrhea, nausea and vomiting. Genitourinary: Negative for dysuria and hematuria. Musculoskeletal: Negative for joint pain. Skin: Negative for rash. Neurological: Negative for dizziness, seizures and loss of consciousness. Endo/Heme/Allergies: Negative for polydipsia.  Does not bruise/bleed easily. Psychiatric/Behavioral: Negative for depression and substance abuse. The patient does not have insomnia. Allergies   Allergen Reactions    Pcn [Penicillins] Rash and Unknown (comments)     Other reaction(s): unknown       Family History   Problem Relation Age of Onset    Heart Attack Father 54    Heart Disease Other         Positive family history of heart disesae    Sudden Death Neg Hx        Social History     Tobacco Use    Smoking status: Never Smoker    Smokeless tobacco: Never Used   Substance Use Topics    Alcohol use: No    Drug use: No        Current Outpatient Medications   Medication Sig    acetaminophen (TYLENOL) 650 mg TbER Take 650 mg by mouth every eight (8) hours.  hydroCHLOROthiazide (HYDRODIURIL) 25 mg tablet TAKE HALF A TABLET BY MOUTH ONCE A DAY.  lisinopril (PRINIVIL, ZESTRIL) 5 mg tablet take 1 tablet by mouth once daily    leuprolide depot (LUPRON DEPOT, 6 MONTH,) 45 mg injection 45 mg by IntraMUSCular route once.  bicalutamide (CASODEX) 50 mg tablet take 1 tablet by mouth once daily    metoprolol (LOPRESSOR) 50 mg tablet Take  by mouth two (2) times a day.  simvastatin (ZOCOR) 20 mg tablet Take  by mouth nightly.  aspirin delayed-release 81 mg tablet Take  by mouth daily. No current facility-administered medications for this visit. History reviewed. No pertinent surgical history. Diagnostic Studies:  CARDIOLOGY STUDIES 2/10/2016   EKG Result SR, WNL   Some recent data might be hidden   12/18 echo   interpretation Summary     · Estimated left ventricular ejection fraction is 56 - 60%. Normal left ventricular wall motion, no regional wall motion abnormality noted. · Normal right ventricular size and function. · Trace mitral valve regurgitation. · Mild tricuspid valve regurgitation is present. There is no evidence of pulmonary hypertension. · Mild pulmonic valve regurgitation is present.   · Trivial pericardial effusion with fat pad noted. Comparison Study Information     Prior Study     There is a prior study available for comparison that was performed on 1/30/2009. As compared to the previous study, there are no significant changes. 12/18 Nuclear Stress Test     Negative myocardial perfusion imaging. Myocardial perfusion imaging supports a low risk stress test.   There is a prior study available for comparison. Interpretation Summary     · Baseline ECG: Sinus rhythm. · Negative stress electrocardiogram.  · Gated SPECT: Left ventricular function post-stress was normal. Calculated ejection fraction is 90%. There is no evidence of transient ischemic dilation (TID). The TID ratio is 1.01.  · Left ventricular perfusion is normal.  · Negative myocardial perfusion imaging. Myocardial perfusion imaging supports a low risk stress test.  · Myocardial perfusion imaging defect 1: There is a defect that is small in size with a mild reduction in uptake present in the inferior location(s) that is non-reversible. There is normal wall motion in the defect area. Viability in the area is good. The defect appears to be an artifact caused by subdiaphragmatic activity. Visit Vitals  /62   Pulse 69   Ht 5' 9\" (1.753 m)   Wt 99.3 kg (219 lb)   BMI 32.34 kg/m²       Mr. Gilberto Perez has a reminder for a \"due or due soon\" health maintenance. I have asked that he contact his primary care provider for follow-up on this health maintenance. Physical Exam   Constitutional: He is oriented to person, place, and time. He appears well-developed and well-nourished. No distress. obese   HENT:   Head: Normocephalic and atraumatic. Mouth/Throat: Normal dentition. Eyes: Right eye exhibits no discharge. Left eye exhibits no discharge. No scleral icterus. Neck: Neck supple. No JVD present. Carotid bruit is not present. No thyromegaly present.    Cardiovascular: Normal rate, regular rhythm, S1 normal, S2 normal, normal heart sounds and intact distal pulses. Exam reveals no gallop and no friction rub. No murmur heard. Pulmonary/Chest: Effort normal and breath sounds normal. He has no wheezes. He has no rales. Abdominal: Soft. He exhibits no mass. There is no abdominal tenderness. Musculoskeletal:         General: No edema. Lymphadenopathy:        Right cervical: No superficial cervical adenopathy present. Left cervical: No superficial cervical adenopathy present. Neurological: He is alert and oriented to person, place, and time. Skin: Skin is warm and dry. No rash noted. Psychiatric: He has a normal mood and affect. His behavior is normal.   Nursing note and vitals reviewed. ASSESSMENT and PLAN    Weight loss has been strongly encouraged by following dietary restrictions and an exercise routine. HLD:   Lipid Complete Panel7/19/2018  [employer]  Component Name Value Ref Range   Cholesterol 156 110 - 200 mg/dL   Triglyceride 76 40 - 149 mg/dL   HDL 66 (H) 40 - 59 mg/dL   Cholesterol/HDL 2.4 0.0 - 5.0    LDL CALCULATION 75 50 - 99 mg/dL   VLDL CALCULATION 15 8 - 30 mg/dL         CAD: 11/17; 11/16 stable symptoms; 2006 noncritical        2/2020  Stable cardiac status. Blood pressure is controlled. Due for lipids and LFTs which have been ordered. Continue current medications. Diagnoses and all orders for this visit:    1. Atherosclerosis of native coronary artery of native heart without angina pectoris  -     AMB POC EKG ROUTINE W/ 12 LEADS, INTER & REP    2. Essential hypertension, benign    3. Pure hypercholesterolemia  -     LIPID PANEL; Future  -     HEPATIC FUNCTION PANEL; Future    4. Obesity (BMI 30.0-34. 9)        Pertinent laboratory and test data reviewed and discussed with patient.   See patient instructions also for other medical advice given    Medications Discontinued During This Encounter   Medication Reason    amLODIPine (NORVASC) 5 mg tablet        Follow-up and Dispositions    · Return in about 1 year (around 2/6/2021), or if symptoms worsen or fail to improve. I have independently evaluated and examined the patient. Chest pains are persisting and atypical and are chronic now. They are likely musculoskeletal.  No further cardiac work-up recommended. No evidence of CHF and blood pressure is controlled. CAD stable. Continue same medications and follow labs as ordered. All relevant labs and testing data's are reviewed. Care plan discussed and updated after review.   Michela Bloom MD

## 2020-02-06 NOTE — PATIENT INSTRUCTIONS
Have fasting blood work drawn    Heart-Healthy Diet: Care Instructions  Your Care Instructions    A heart-healthy diet has lots of vegetables, fruits, nuts, beans, and whole grains, and is low in salt. It limits foods that are high in saturated fat, such as meats, cheeses, and fried foods. It may be hard to change your diet, but even small changes can lower your risk of heart attack and heart disease. Follow-up care is a key part of your treatment and safety. Be sure to make and go to all appointments, and call your doctor if you are having problems. It's also a good idea to know your test results and keep a list of the medicines you take. How can you care for yourself at home? Watch your portions  · Learn what a serving is. A \"serving\" and a \"portion\" are not always the same thing. Make sure that you are not eating larger portions than are recommended. For example, a serving of pasta is ½ cup. A serving size of meat is 2 to 3 ounces. A 3-ounce serving is about the size of a deck of cards. Measure serving sizes until you are good at [place]" them. Keep in mind that restaurants often serve portions that are 2 or 3 times the size of one serving. · To keep your energy level up and keep you from feeling hungry, eat often but in smaller portions. · Eat only the number of calories you need to stay at a healthy weight. If you need to lose weight, eat fewer calories than your body burns (through exercise and other physical activity). Eat more fruits and vegetables  · Eat a variety of fruit and vegetables every day. Dark green, deep orange, red, or yellow fruits and vegetables are especially good for you. Examples include spinach, carrots, peaches, and berries. · Keep carrots, celery, and other veggies handy for snacks. Buy fruit that is in season and store it where you can see it so that you will be tempted to eat it. · Cook dishes that have a lot of veggies in them, such as stir-fries and soups.   Limit saturated and trans fat  · Read food labels, and try to avoid saturated and trans fats. They increase your risk of heart disease. Trans fat is found in many processed foods such as cookies and crackers. · Use olive or canola oil when you cook. Try cholesterol-lowering spreads, such as Benecol or Take Control. · Bake, broil, grill, or steam foods instead of frying them. · Choose lean meats instead of high-fat meats such as hot dogs and sausages. Cut off all visible fat when you prepare meat. · Eat fish, skinless poultry, and meat alternatives such as soy products instead of high-fat meats. Soy products, such as tofu, may be especially good for your heart. · Choose low-fat or fat-free milk and dairy products. Eat fish  · Eat at least two servings of fish a week. Certain fish, such as salmon and tuna, contain omega-3 fatty acids, which may help reduce your risk of heart attack. Eat foods high in fiber  · Eat a variety of grain products every day. Include whole-grain foods that have lots of fiber and nutrients. Examples of whole-grain foods include oats, whole wheat bread, and brown rice. · Buy whole-grain breads and cereals, instead of white bread or pastries. Limit salt and sodium  · Limit how much salt and sodium you eat to help lower your blood pressure. · Taste food before you salt it. Add only a little salt when you think you need it. With time, your taste buds will adjust to less salt. · Eat fewer snack items, fast foods, and other high-salt, processed foods. Check food labels for the amount of sodium in packaged foods. · Choose low-sodium versions of canned goods (such as soups, vegetables, and beans). Limit sugar  · Limit drinks and foods with added sugar. These include candy, desserts, and soda pop. Limit alcohol  · Limit alcohol to no more than 2 drinks a day for men and 1 drink a day for women. Too much alcohol can cause health problems. When should you call for help?   Watch closely for changes in your health, and be sure to contact your doctor if:    · You would like help planning heart-healthy meals. Where can you learn more? Go to http://karlos-sammi.info/. Enter V137 in the search box to learn more about \"Heart-Healthy Diet: Care Instructions. \"  Current as of: April 9, 2019  Content Version: 12.2  © 1312-4151 [employer]. Care instructions adapted under license by [employer] (which disclaims liability or warranty for this information). If you have questions about a medical condition or this instruction, always ask your healthcare professional. [place] [address] any warranty or liability for your use of this information.

## 2020-02-06 NOTE — PROGRESS NOTES
1. Have you been to the ER, urgent care clinic since your last visit? Hospitalized since your last visit? Yes When: 12/2019 Where: [facility] Reason for visit: leg cramps    2. Have you seen or consulted any other health care providers outside of the [address] [place] [address] since your last visit? Include any pap smears or colon screening. Yes Where: [facility]     3. Since your last visit, have you had any of the following symptoms? chest pains. 4.  Have you had any blood work, X-rays or cardiac testing? Yes Where: [facility]         5. Where do you normally have your labs drawn? [facility]  6. Do you need any refills today?    no

## 2020-02-12 DIAGNOSIS — I10 ESSENTIAL HYPERTENSION, BENIGN: ICD-10-CM

## 2020-02-12 LAB
A-G RATIO,AGRAT: 1.7 RATIO (ref 1.1–2.6)
ALBUMIN SERPL-MCNC: 4.5 G/DL (ref 3.5–5)
ALP SERPL-CCNC: 67 U/L (ref 40–125)
ALT SERPL-CCNC: 11 U/L (ref 5–40)
AST SERPL W P-5'-P-CCNC: 21 U/L (ref 10–37)
BILIRUB SERPL-MCNC: 0.8 MG/DL (ref 0.2–1.2)
BILIRUBIN, DIRECT,CBIL: <0.2 MG/DL (ref 0–0.3)
CHOLEST SERPL-MCNC: 148 MG/DL (ref 110–200)
GLOBULIN,GLOB: 2.6 G/DL (ref 2–4)
HDLC SERPL-MCNC: 2.4 MG/DL (ref 0–5)
HDLC SERPL-MCNC: 62 MG/DL
LDL/HDL RATIO,LDHD: 1.2
LDLC SERPL CALC-MCNC: 76 MG/DL (ref 50–99)
NON-HDL CHOLESTEROL, 011976: 86 MG/DL
PROT SERPL-MCNC: 7.1 G/DL (ref 6.2–8.1)
TRIGL SERPL-MCNC: 52 MG/DL (ref 40–149)
VLDLC SERPL CALC-MCNC: 10 MG/DL (ref 8–30)

## 2020-02-12 RX ORDER — LISINOPRIL 5 MG/1
TABLET ORAL
Qty: 90 TAB | Refills: 1 | Status: SHIPPED | OUTPATIENT
Start: 2020-02-12 | End: 2020-10-27

## 2020-02-17 ENCOUNTER — TELEPHONE (OUTPATIENT)
Dept: CARDIOLOGY CLINIC | Age: 80
End: 2020-02-17

## 2020-02-17 NOTE — TELEPHONE ENCOUNTER
Called and spoke to patient per NP CLARE MCMILLAN [facility], lab reviewed, no significant abnormality - continue current treatment. He voices understanding and acceptance of this advice and will call back if any further questions or concerns.

## 2020-02-17 NOTE — TELEPHONE ENCOUNTER
----- Message from Mildred Allred NP sent at 2/14/2020  4:17 PM EST -----  Labs reviewed, no significant abnormality - continue current treatment

## 2020-02-21 NOTE — PROGRESS NOTES
Appointment was made for Mr. Maria Guadalupe Feliciano for Lupron injection and Bone scan results. Internal Medicine

## 2020-09-04 RX ORDER — HYDROCHLOROTHIAZIDE 25 MG/1
TABLET ORAL
Qty: 30 TAB | Refills: 3 | Status: SHIPPED | OUTPATIENT
Start: 2020-09-04 | End: 2021-04-26

## 2020-10-27 DIAGNOSIS — I10 ESSENTIAL HYPERTENSION, BENIGN: ICD-10-CM

## 2020-10-27 RX ORDER — LISINOPRIL 5 MG/1
TABLET ORAL
Qty: 90 TAB | Refills: 1 | Status: SHIPPED | OUTPATIENT
Start: 2020-10-27 | End: 2021-04-22

## 2021-02-04 ENCOUNTER — OFFICE VISIT (OUTPATIENT)
Dept: CARDIOLOGY CLINIC | Age: 81
End: 2021-02-04
Payer: MEDICARE

## 2021-02-04 VITALS
TEMPERATURE: 97.6 F | HEIGHT: 69 IN | WEIGHT: 206 LBS | SYSTOLIC BLOOD PRESSURE: 131 MMHG | DIASTOLIC BLOOD PRESSURE: 73 MMHG | BODY MASS INDEX: 30.51 KG/M2 | HEART RATE: 72 BPM

## 2021-02-04 DIAGNOSIS — E78.00 PURE HYPERCHOLESTEROLEMIA: ICD-10-CM

## 2021-02-04 DIAGNOSIS — I25.10 ATHEROSCLEROSIS OF NATIVE CORONARY ARTERY OF NATIVE HEART WITHOUT ANGINA PECTORIS: Primary | ICD-10-CM

## 2021-02-04 DIAGNOSIS — I10 ESSENTIAL HYPERTENSION, BENIGN: ICD-10-CM

## 2021-02-04 DIAGNOSIS — E66.9 OBESITY (BMI 30.0-34.9): ICD-10-CM

## 2021-02-04 PROCEDURE — 1101F PT FALLS ASSESS-DOCD LE1/YR: CPT | Performed by: INTERNAL MEDICINE

## 2021-02-04 PROCEDURE — G8427 DOCREV CUR MEDS BY ELIG CLIN: HCPCS | Performed by: INTERNAL MEDICINE

## 2021-02-04 PROCEDURE — G8432 DEP SCR NOT DOC, RNG: HCPCS | Performed by: INTERNAL MEDICINE

## 2021-02-04 PROCEDURE — 93000 ELECTROCARDIOGRAM COMPLETE: CPT | Performed by: INTERNAL MEDICINE

## 2021-02-04 PROCEDURE — 99214 OFFICE O/P EST MOD 30 MIN: CPT | Performed by: INTERNAL MEDICINE

## 2021-02-04 PROCEDURE — G8752 SYS BP LESS 140: HCPCS | Performed by: INTERNAL MEDICINE

## 2021-02-04 PROCEDURE — G8754 DIAS BP LESS 90: HCPCS | Performed by: INTERNAL MEDICINE

## 2021-02-04 PROCEDURE — G8536 NO DOC ELDER MAL SCRN: HCPCS | Performed by: INTERNAL MEDICINE

## 2021-02-04 PROCEDURE — G8417 CALC BMI ABV UP PARAM F/U: HCPCS | Performed by: INTERNAL MEDICINE

## 2021-02-04 RX ORDER — POLYETHYLENE GLYCOL 3350 17 G/17G
17 POWDER, FOR SOLUTION ORAL DAILY
COMMUNITY

## 2021-02-04 NOTE — PROGRESS NOTES
HISTORY OF PRESENT ILLNESS  Preet Hines is a 79 y.o. male. 2/2020 occasional left side chest pain with shoulder pain which is positional and resolves with repositiioning. 2/19 occasional left infra axillary chest pain which is positional.  Improves spontaneously and about 5 minutes. Left deltoid area pains off and on but likely positional as he quilts with extended arms; 5/16 resolved    Hypertension  The history is provided by the patient and medical records. This is a chronic problem. The problem has not changed since onset. Pertinent negatives include no chest pain and no shortness of breath. Cholesterol Problem  The history is provided by the medical records. This is a chronic problem. Pertinent negatives include no chest pain and no shortness of breath. Follow Up Chronic Condition  The history is provided by the medical records (cad). Pertinent negatives include no chest pain and no shortness of breath. Leg Swelling  The history is provided by the patient. This is a new problem. The current episode started more than 1 week ago. The problem occurs daily. The problem has been resolved. Pertinent negatives include no chest pain and no shortness of breath. The symptoms are aggravated by standing. The symptoms are relieved by sleep. Review of Systems   Constitutional: Negative for chills, diaphoresis, fever, malaise/fatigue and weight loss. HENT: Negative for nosebleeds. Eyes: Negative for discharge. Respiratory: Negative for cough, shortness of breath and wheezing. Cardiovascular: Negative for chest pain, palpitations, orthopnea, claudication, leg swelling and PND. Gastrointestinal: Negative for diarrhea, nausea and vomiting. Genitourinary: Negative for dysuria and hematuria. Musculoskeletal: Positive for joint pain (left shoulder pain). Skin: Negative for rash. Neurological: Negative for dizziness, seizures and loss of consciousness.    Endo/Heme/Allergies: Negative for polydipsia. Does not bruise/bleed easily. Psychiatric/Behavioral: Negative for depression and substance abuse. The patient does not have insomnia. Allergies   Allergen Reactions    Pcn [Penicillins] Rash and Unknown (comments)     Other reaction(s): unknown       Family History   Problem Relation Age of Onset    Heart Attack Father 54    Heart Disease Other         Positive family history of heart disesae    Sudden Death Neg Hx        Social History     Tobacco Use    Smoking status: Never Smoker    Smokeless tobacco: Never Used   Substance Use Topics    Alcohol use: No    Drug use: No        Current Outpatient Medications   Medication Sig    polyethylene glycol (Miralax) 17 gram/dose powder Take 17 g by mouth daily.  calcium-cholecalciferol, d3, (CALCIUM 600 + D) 600-125 mg-unit tab Take  by mouth.  lisinopriL (PRINIVIL, ZESTRIL) 5 mg tablet take 1 tablet by mouth once daily    hydroCHLOROthiazide (HYDRODIURIL) 25 mg tablet TAKE HALF A TABLET BY MOUTH ONCE A DAY.  acetaminophen (TYLENOL) 650 mg TbER Take 650 mg by mouth every eight (8) hours.  metoprolol (LOPRESSOR) 50 mg tablet Take  by mouth two (2) times a day.  simvastatin (ZOCOR) 20 mg tablet Take  by mouth nightly.  aspirin delayed-release 81 mg tablet Take  by mouth daily. No current facility-administered medications for this visit. History reviewed. No pertinent surgical history. Diagnostic Studies:  CARDIOLOGY STUDIES 2/10/2016   EKG Result SR, WNL   Some recent data might be hidden   12/18 echo   interpretation Summary     · Estimated left ventricular ejection fraction is 56 - 60%. Normal left ventricular wall motion, no regional wall motion abnormality noted. · Normal right ventricular size and function. · Trace mitral valve regurgitation. · Mild tricuspid valve regurgitation is present. There is no evidence of pulmonary hypertension. · Mild pulmonic valve regurgitation is present.   · Trivial pericardial effusion with fat pad noted. Comparison Study Information     Prior Study     There is a prior study available for comparison that was performed on 1/30/2009. As compared to the previous study, there are no significant changes. 12/18 Nuclear Stress Test     Negative myocardial perfusion imaging. Myocardial perfusion imaging supports a low risk stress test.   There is a prior study available for comparison. Interpretation Summary     · Baseline ECG: Sinus rhythm. · Negative stress electrocardiogram.  · Gated SPECT: Left ventricular function post-stress was normal. Calculated ejection fraction is 90%. There is no evidence of transient ischemic dilation (TID). The TID ratio is 1.01.  · Left ventricular perfusion is normal.  · Negative myocardial perfusion imaging. Myocardial perfusion imaging supports a low risk stress test.  · Myocardial perfusion imaging defect 1: There is a defect that is small in size with a mild reduction in uptake present in the inferior location(s) that is non-reversible. There is normal wall motion in the defect area. Viability in the area is good. The defect appears to be an artifact caused by subdiaphragmatic activity. Visit Vitals  /73   Pulse 72   Temp 97.6 °F (36.4 °C) (Temporal)   Ht 5' 9\" (1.753 m)   Wt 93.4 kg (206 lb)   BMI 30.42 kg/m²       Mr. Pat Dean has a reminder for a \"due or due soon\" health maintenance. I have asked that he contact his primary care provider for follow-up on this health maintenance. Physical Exam   Constitutional: He is oriented to person, place, and time. He appears well-developed and well-nourished. No distress. obese   HENT:   Head: Normocephalic and atraumatic. Mouth/Throat: Normal dentition. Eyes: Right eye exhibits no discharge. Left eye exhibits no discharge. No scleral icterus. Neck: Neck supple. No JVD present. Carotid bruit is not present. No thyromegaly present.    Cardiovascular: Normal rate, regular rhythm, S1 normal, S2 normal, normal heart sounds and intact distal pulses. Exam reveals no gallop and no friction rub. No murmur heard. Pulmonary/Chest: Effort normal and breath sounds normal. He has no wheezes. He has no rales. Abdominal: Soft. He exhibits no mass. There is no abdominal tenderness. Musculoskeletal: Normal range of motion. General: No edema. Lymphadenopathy:        Right cervical: No superficial cervical adenopathy present. Left cervical: No superficial cervical adenopathy present. Neurological: He is alert and oriented to person, place, and time. Skin: Skin is warm and dry. No rash noted. Psychiatric: He has a normal mood and affect. His behavior is normal.   Nursing note and vitals reviewed. ASSESSMENT and PLAN    Weight loss has been strongly encouraged by following dietary restrictions and an exercise routine. HLD:   Lipid Complete Panel7/19/2018  formerly [facility]  Component Name Value Ref Range   Cholesterol 156 110 - 200 mg/dL   Triglyceride 76 40 - 149 mg/dL   HDL 66 (H) 40 - 59 mg/dL   Cholesterol/HDL 2.4 0.0 - 5.0    LDL CALCULATION 75 50 - 99 mg/dL   VLDL CALCULATION 15 8 - 30 mg/dL         CAD: 11/17; 11/16 stable symptoms; 2006 noncritical    2/2020  Stable cardiac status. Blood pressure is controlled. Due for lipids and LFTs which have been ordered. Continue current medications. 2/2021   Stable CAD. He denies chest pain, SOB, palpitations or edema. BMP 8/20 and lipids 2/20  reviewed and discussed with patient. Today's EKG - NSR  Continue current medications, encouraged to begin exercise routine. Diagnoses and all orders for this visit:    1. Atherosclerosis of native coronary artery of native heart without angina pectoris  -     AMB POC EKG ROUTINE W/ 12 LEADS, INTER & REP    2. Essential hypertension, benign    3. Pure hypercholesterolemia  -     LIPID PANEL; Future  -     METABOLIC PANEL, COMPREHENSIVE; Future    4.  Obesity (BMI 30.0-34. 9)        Pertinent laboratory and test data reviewed and discussed with patient. See patient instructions also for other medical advice given    There are no discontinued medications. Follow-up and Dispositions    · Return in about 1 year (around 2/4/2022), or if symptoms worsen or fail to improve. Ryan Mendoza NP   I have independently evaluated and examined the patient. Chest pains have resolved. Physical exam shows clear lung fields, no pedal edema, normal and regular S1 and S2. EKG today reviewed and does not show any acute ST-T changes. No evidence of CHF and blood pressure is controlled. CAD stable. Diet and exercise discussed. Patient trying to follow very well. Labs as ordered. Continue same medications and follow labs as ordered. All relevant labs and testing data's are reviewed. Care plan discussed and updated after review.   Debrah Najjar, MD

## 2021-02-04 NOTE — PATIENT INSTRUCTIONS
Heart-Healthy Diet: Care Instructions Your Care Instructions A heart-healthy diet has lots of vegetables, fruits, nuts, beans, and whole grains, and is low in salt. It limits foods that are high in saturated fat, such as meats, cheeses, and fried foods. It may be hard to change your diet, but even small changes can lower your risk of heart attack and heart disease. Follow-up care is a key part of your treatment and safety. Be sure to make and go to all appointments, and call your doctor if you are having problems. It's also a good idea to know your test results and keep a list of the medicines you take. How can you care for yourself at home? Watch your portions · Learn what a serving is. A \"serving\" and a \"portion\" are not always the same thing. Make sure that you are not eating larger portions than are recommended. For example, a serving of pasta is ½ cup. A serving size of meat is 2 to 3 ounces. A 3-ounce serving is about the size of a deck of cards. Measure serving sizes until you are good at [place]" them. Keep in mind that restaurants often serve portions that are 2 or 3 times the size of one serving. · To keep your energy level up and keep you from feeling hungry, eat often but in smaller portions. · Eat only the number of calories you need to stay at a healthy weight. If you need to lose weight, eat fewer calories than your body burns (through exercise and other physical activity). Eat more fruits and vegetables · Eat a variety of fruit and vegetables every day. Dark green, deep orange, red, or yellow fruits and vegetables are especially good for you. Examples include spinach, carrots, peaches, and berries. · Keep carrots, celery, and other veggies handy for snacks. Buy fruit that is in season and store it where you can see it so that you will be tempted to eat it. · Cook dishes that have a lot of veggies in them, such as stir-fries and soups. Limit saturated and trans fat · Read food labels, and try to avoid saturated and trans fats. They increase your risk of heart disease. · Use olive or canola oil when you cook. · Bake, broil, grill, or steam foods instead of frying them. · Choose lean meats instead of high-fat meats such as hot dogs and sausages. Cut off all visible fat when you prepare meat. · Eat fish, skinless poultry, and meat alternatives such as soy products instead of high-fat meats. Soy products, such as tofu, may be especially good for your heart. · Choose low-fat or fat-free milk and dairy products. Eat foods high in fiber · Eat a variety of grain products every day. Include whole-grain foods that have lots of fiber and nutrients. Examples of whole-grain foods include oats, whole wheat bread, and brown rice. · Buy whole-grain breads and cereals, instead of white bread or pastries. Limit salt and sodium · Limit how much salt and sodium you eat to help lower your blood pressure. · Taste food before you salt it. Add only a little salt when you think you need it. With time, your taste buds will adjust to less salt. · Eat fewer snack items, fast foods, and other high-salt, processed foods. Check food labels for the amount of sodium in packaged foods. · Choose low-sodium versions of canned goods (such as soups, vegetables, and beans). Limit sugar · Limit drinks and foods with added sugar. These include candy, desserts, and soda pop. Limit alcohol · Limit alcohol to no more than 2 drinks a day for men and 1 drink a day for women. Too much alcohol can cause health problems. When should you call for help? Watch closely for changes in your health, and be sure to contact your doctor if: 
  · You would like help planning heart-healthy meals. Where can you learn more? Go to http://www.[employer].com/ Enter V137 in the search box to learn more about \"Heart-Healthy Diet: Care Instructions. \" 
 Current as of: August 22, 2019               Content Version: 12.6 © 4494-0948 [employer]. Care instructions adapted under license by [employer] (which disclaims liability or warranty for this information). If you have questions about a medical condition or this instruction, always ask your healthcare professional. [address] any warranty or liability for your use of this information.

## 2021-02-04 NOTE — PROGRESS NOTES
1. Have you been to the ER, urgent care clinic since your last visit? Hospitalized since your last visit?     no  2. Have you seen or consulted any other health care providers outside of the [address] [place] [address] since your last visit? Include any pap smears or colon screening. No     3. Since your last visit, have you had any of the following symptoms? chest pains. 4.  Have you had any blood work, X-rays or cardiac testing? Yes Where: [facility]       5. Where do you normally have your labs drawn? [facility]  6. Do you need any refills today?    no

## 2021-04-22 DIAGNOSIS — I10 ESSENTIAL HYPERTENSION, BENIGN: ICD-10-CM

## 2021-04-22 RX ORDER — LISINOPRIL 5 MG/1
TABLET ORAL
Qty: 90 TAB | Refills: 1 | Status: SHIPPED | OUTPATIENT
Start: 2021-04-22 | End: 2021-10-14

## 2021-04-26 RX ORDER — HYDROCHLOROTHIAZIDE 25 MG/1
TABLET ORAL
Qty: 120 TAB | Refills: 1 | Status: SHIPPED | OUTPATIENT
Start: 2021-04-26 | End: 2022-07-05

## 2021-10-14 DIAGNOSIS — I10 ESSENTIAL HYPERTENSION, BENIGN: ICD-10-CM

## 2021-10-14 RX ORDER — LISINOPRIL 5 MG/1
TABLET ORAL
Qty: 90 TABLET | Refills: 1 | Status: SHIPPED | OUTPATIENT
Start: 2021-10-14 | End: 2022-04-11

## 2021-10-15 DIAGNOSIS — I10 ESSENTIAL HYPERTENSION, BENIGN: ICD-10-CM

## 2022-02-02 LAB
A-G RATIO,AGRAT: 1.2 RATIO (ref 1.1–2.6)
ALBUMIN SERPL-MCNC: 4.1 G/DL (ref 3.5–5)
ALP SERPL-CCNC: 74 U/L (ref 40–125)
ALT SERPL-CCNC: 12 U/L (ref 5–40)
ANION GAP SERPL CALC-SCNC: 13 MMOL/L (ref 3–15)
AST SERPL W P-5'-P-CCNC: 20 U/L (ref 10–37)
BILIRUB SERPL-MCNC: 0.7 MG/DL (ref 0.2–1.2)
BUN SERPL-MCNC: 11 MG/DL (ref 6–22)
CALCIUM SERPL-MCNC: 9.7 MG/DL (ref 8.4–10.5)
CHLORIDE SERPL-SCNC: 100 MMOL/L (ref 98–110)
CHOLEST SERPL-MCNC: 157 MG/DL (ref 110–200)
CO2 SERPL-SCNC: 25 MMOL/L (ref 20–32)
CREAT SERPL-MCNC: 0.8 MG/DL (ref 0.8–1.6)
GFRAA, 66117: >60
GFRNA, 66118: >60
GLOBULIN,GLOB: 3.3 G/DL (ref 2–4)
GLUCOSE SERPL-MCNC: 101 MG/DL (ref 70–99)
HDLC SERPL-MCNC: 2.7 MG/DL (ref 0–5)
HDLC SERPL-MCNC: 59 MG/DL
LDL/HDL RATIO,LDHD: 1.3
LDLC SERPL CALC-MCNC: 77 MG/DL (ref 50–99)
NON-HDL CHOLESTEROL, 011976: 98 MG/DL
POTASSIUM SERPL-SCNC: 4.3 MMOL/L (ref 3.5–5.5)
PROT SERPL-MCNC: 7.4 G/DL (ref 6.2–8.1)
SODIUM SERPL-SCNC: 138 MMOL/L (ref 133–145)
TRIGL SERPL-MCNC: 106 MG/DL (ref 40–149)
VLDLC SERPL CALC-MCNC: 21 MG/DL (ref 8–30)

## 2022-02-14 ENCOUNTER — OFFICE VISIT (OUTPATIENT)
Dept: CARDIOLOGY CLINIC | Age: 82
End: 2022-02-14
Payer: MEDICARE

## 2022-02-14 VITALS
OXYGEN SATURATION: 97 % | BODY MASS INDEX: 30.66 KG/M2 | SYSTOLIC BLOOD PRESSURE: 138 MMHG | HEART RATE: 77 BPM | WEIGHT: 207 LBS | DIASTOLIC BLOOD PRESSURE: 68 MMHG | HEIGHT: 69 IN

## 2022-02-14 DIAGNOSIS — E78.00 PURE HYPERCHOLESTEROLEMIA: ICD-10-CM

## 2022-02-14 DIAGNOSIS — E66.9 OBESITY (BMI 30.0-34.9): ICD-10-CM

## 2022-02-14 DIAGNOSIS — I10 ESSENTIAL HYPERTENSION, BENIGN: ICD-10-CM

## 2022-02-14 DIAGNOSIS — I25.10 ATHEROSCLEROSIS OF NATIVE CORONARY ARTERY OF NATIVE HEART WITHOUT ANGINA PECTORIS: Primary | ICD-10-CM

## 2022-02-14 PROCEDURE — G8754 DIAS BP LESS 90: HCPCS | Performed by: INTERNAL MEDICINE

## 2022-02-14 PROCEDURE — G8752 SYS BP LESS 140: HCPCS | Performed by: INTERNAL MEDICINE

## 2022-02-14 PROCEDURE — G8536 NO DOC ELDER MAL SCRN: HCPCS | Performed by: INTERNAL MEDICINE

## 2022-02-14 PROCEDURE — G8432 DEP SCR NOT DOC, RNG: HCPCS | Performed by: INTERNAL MEDICINE

## 2022-02-14 PROCEDURE — 99214 OFFICE O/P EST MOD 30 MIN: CPT | Performed by: INTERNAL MEDICINE

## 2022-02-14 PROCEDURE — G8417 CALC BMI ABV UP PARAM F/U: HCPCS | Performed by: INTERNAL MEDICINE

## 2022-02-14 PROCEDURE — 93000 ELECTROCARDIOGRAM COMPLETE: CPT | Performed by: INTERNAL MEDICINE

## 2022-02-14 PROCEDURE — G8427 DOCREV CUR MEDS BY ELIG CLIN: HCPCS | Performed by: INTERNAL MEDICINE

## 2022-02-14 PROCEDURE — 1101F PT FALLS ASSESS-DOCD LE1/YR: CPT | Performed by: INTERNAL MEDICINE

## 2022-02-14 RX ORDER — CHOLECALCIFEROL (VITAMIN D3) 125 MCG
50 CAPSULE ORAL
COMMUNITY

## 2022-02-14 RX ORDER — ATORVASTATIN CALCIUM 40 MG/1
40 TABLET, FILM COATED ORAL DAILY
Qty: 30 TABLET | Refills: 5 | Status: SHIPPED | OUTPATIENT
Start: 2022-02-14 | End: 2022-07-05 | Stop reason: SDUPTHER

## 2022-02-14 NOTE — PROGRESS NOTES
HISTORY OF PRESENT ILLNESS  Balwinder Thomas is a 79 y.o. male. Follow-up of CAD, hypertension, hyperlipidemia    2/2020 occasional left side chest pain with shoulder pain which is positional and resolves with repositiioning. 2/19 occasional left infra axillary chest pain which is positional.  Improves spontaneously and about 5 minutes. Left deltoid area pains off and on but likely positional as he quilts with extended arms; 5/16 resolved    Follow-up  Pertinent negatives include no chest pain and no shortness of breath. Review of Systems   Constitutional: Negative for chills, diaphoresis, fever, malaise/fatigue and weight loss. HENT: Negative for nosebleeds. Eyes: Negative for discharge. Respiratory: Negative for cough, shortness of breath and wheezing. Cardiovascular: Negative for chest pain, palpitations, orthopnea, claudication, leg swelling and PND. Gastrointestinal: Negative for diarrhea, nausea and vomiting. Genitourinary: Negative for dysuria and hematuria. Musculoskeletal: Negative for joint pain. Skin: Negative for rash. Neurological: Negative for dizziness, seizures and loss of consciousness. Endo/Heme/Allergies: Negative for polydipsia. Does not bruise/bleed easily. Psychiatric/Behavioral: Negative for depression and substance abuse. The patient does not have insomnia. Allergies   Allergen Reactions    Pcn [Penicillins] Rash and Unknown (comments)     Other reaction(s): unknown       Family History   Problem Relation Age of Onset    Heart Attack Father 54    Heart Disease Other         Positive family history of heart disesae    Sudden Death Neg Hx        Social History     Tobacco Use    Smoking status: Never Smoker    Smokeless tobacco: Never Used   Substance Use Topics    Alcohol use: No    Drug use: No        Current Outpatient Medications   Medication Sig    cholecalciferol, vitamin D3, (Vitamin D3) 50 mcg (2,000 unit) tab Take 50 Tablets by mouth.  lisinopriL (PRINIVIL, ZESTRIL) 5 mg tablet take 1 tablet by mouth once daily    hydroCHLOROthiazide (HYDRODIURIL) 25 mg tablet TAKE HALF A TABLET BY MOUTH ONCE A DAY.  polyethylene glycol (Miralax) 17 gram/dose powder Take 17 g by mouth daily.  calcium-cholecalciferol, d3, (CALCIUM 600 + D) 600-125 mg-unit tab Take  by mouth.  acetaminophen (TYLENOL) 650 mg TbER Take 650 mg by mouth every eight (8) hours.  metoprolol (LOPRESSOR) 50 mg tablet Take  by mouth two (2) times a day.  simvastatin (ZOCOR) 20 mg tablet Take  by mouth nightly.  aspirin delayed-release 81 mg tablet Take  by mouth daily. No current facility-administered medications for this visit. No past surgical history on file. Diagnostic Studies:  No flowsheet data found. 12/18 echo   interpretation Summary     · Estimated left ventricular ejection fraction is 56 - 60%. Normal left ventricular wall motion, no regional wall motion abnormality noted. · Normal right ventricular size and function. · Trace mitral valve regurgitation. · Mild tricuspid valve regurgitation is present. There is no evidence of pulmonary hypertension. · Mild pulmonic valve regurgitation is present. · Trivial pericardial effusion with fat pad noted. Comparison Study Information     Prior Study     There is a prior study available for comparison that was performed on 1/30/2009. As compared to the previous study, there are no significant changes. 12/18 Nuclear Stress Test     Negative myocardial perfusion imaging. Myocardial perfusion imaging supports a low risk stress test.   There is a prior study available for comparison. Interpretation Summary     · Baseline ECG: Sinus rhythm. · Negative stress electrocardiogram.  · Gated SPECT: Left ventricular function post-stress was normal. Calculated ejection fraction is 90%. There is no evidence of transient ischemic dilation (TID). The TID ratio is 1.01.  · Left ventricular perfusion is normal.  · Negative myocardial perfusion imaging. Myocardial perfusion imaging supports a low risk stress test.  · Myocardial perfusion imaging defect 1: There is a defect that is small in size with a mild reduction in uptake present in the inferior location(s) that is non-reversible. There is normal wall motion in the defect area. Viability in the area is good. The defect appears to be an artifact caused by subdiaphragmatic activity. Visit Vitals  /68 (BP 1 Location: Left upper arm, BP Patient Position: Sitting, BP Cuff Size: Adult)   Pulse 77   Ht 5' 9\" (1.753 m)   Wt 93.9 kg (207 lb)   SpO2 97%   BMI 30.57 kg/m²       Mr. Kareen Cam has a reminder for a \"due or due soon\" health maintenance. I have asked that he contact his primary care provider for follow-up on this health maintenance. Physical Exam  Vitals and nursing note reviewed. Constitutional:       General: He is not in acute distress. Appearance: He is well-developed. He is obese. Comments: obese   HENT:      Head: Normocephalic and atraumatic. Mouth/Throat:      Dentition: Normal dentition. Eyes:      General: No scleral icterus. Right eye: No discharge. Left eye: No discharge. Neck:      Thyroid: No thyromegaly. Vascular: No carotid bruit or JVD. Cardiovascular:      Rate and Rhythm: Normal rate and regular rhythm. Pulses: Intact distal pulses. Heart sounds: Normal heart sounds, S1 normal and S2 normal. No murmur heard. No friction rub. No gallop. Pulmonary:      Effort: Pulmonary effort is normal.      Breath sounds: Normal breath sounds. No wheezing or rales. Abdominal:      Palpations: Abdomen is soft. There is no mass. Tenderness: There is no abdominal tenderness. Musculoskeletal:         General: Normal range of motion. Cervical back: Neck supple. Right lower leg: No edema. Left lower leg: No edema.    Lymphadenopathy:      Cervical:      Right cervical: No superficial cervical adenopathy. Left cervical: No superficial cervical adenopathy. Skin:     General: Skin is warm and dry. Findings: No rash. Neurological:      Mental Status: He is alert and oriented to person, place, and time. Psychiatric:         Behavior: Behavior normal.         ASSESSMENT and PLAN    Weight loss has been strongly encouraged by following dietary restrictions and an exercise routine. HLD:   Lipid Complete Panel7/19/2018  [facility]  Component Name Value Ref Range   Cholesterol 156 110 - 200 mg/dL   Triglyceride 76 40 - 149 mg/dL   HDL 66 (H) 40 - 59 mg/dL   Cholesterol/HDL 2.4 0.0 - 5.0    LDL CALCULATION 75 50 - 99 mg/dL   VLDL CALCULATION 15 8 - 30 mg/dL         CAD: 11/17; 11/16 stable symptoms; 2006 noncritical    2/2020  Stable cardiac status. Blood pressure is controlled. Due for lipids and LFTs which have been ordered. Continue current medications. 2/2021   Stable CAD. He denies chest pain, SOB, palpitations or edema. BMP 8/20 and lipids 2/20  reviewed and discussed with patient. Today's EKG - NSR  Continue current medications, encouraged to begin exercise routine. Diagnoses and all orders for this visit:    1. Atherosclerosis of native coronary artery of native heart without angina pectoris    2. Essential hypertension, benign  -     AMB POC EKG ROUTINE W/ 12 LEADS, INTER & REP    3. Pure hypercholesterolemia  -     atorvastatin (LIPITOR) 40 mg tablet; Take 1 Tablet by mouth daily.  -     LIPID PANEL; Future  -     HEPATIC FUNCTION PANEL; Future    4. Obesity (BMI 30.0-34. 9)        Pertinent laboratory and test data reviewed and discussed with patient.   See patient instructions also for other medical advice given    Medications Discontinued During This Encounter   Medication Reason    celecoxib (CELEBREX) 200 mg capsule DISCONTINUED BY ANOTHER CLINICIAN    simvastatin (ZOCOR) 20 mg tablet Alternate Therapy       Follow-up and Dispositions    · Return in about 1 year (around 2/14/2023), or if symptoms worsen or fail to improve, for with ekg.       2/14/2022 CAD stable without any significant symptoms. Blood pressure is controlled. Lipids are better and stable but should be lowered further. Change Zocor to Lipitor and follow the labs. Diet discussed and Mediterranean diet guidelines given.

## 2022-02-14 NOTE — PATIENT INSTRUCTIONS
Medications Discontinued During This Encounter   Medication Reason    celecoxib (CELEBREX) 200 mg capsule DISCONTINUED BY ANOTHER CLINICIAN    simvastatin (ZOCOR) 20 mg tablet Alternate Therapy          Learning About the Mediterranean Diet  What is the Mediterranean diet? The Mediterranean diet is a style of eating rather than a diet plan. It features foods eaten in [place], [place], [place], and other countries along the [facility]. It emphasizes eating foods like fish, fruits, vegetables, beans, high-fiber breads and whole grains, nuts, and olive oil. This style of eating includes limited red meat, cheese, and sweets. Why choose the Mediterranean diet? A Mediterranean-style diet may improve heart health. It contains more fat than other heart-healthy diets. But the fats are mainly from nuts, unsaturated oils (such as fish oils and olive oil), and certain nut or seed oils (such as canola, soybean, or flaxseed oil). These fats may help protect the heart and blood vessels. How can you get started on the Mediterranean diet? Here are some things you can do to switch to a more Mediterranean way of eating. What to eat  · Eat a variety of fruits and vegetables each day, such as grapes, blueberries, tomatoes, broccoli, peppers, figs, olives, spinach, eggplant, beans, lentils, and chickpeas. · Eat a variety of whole-grain foods each day, such as oats, brown rice, and whole wheat bread, pasta, and couscous. · Eat fish at least 2 times a week. Try tuna, salmon, mackerel, lake trout, herring, or sardines. · Eat moderate amounts of low-fat dairy products, such as milk, cheese, or yogurt. · Eat moderate amounts of poultry and eggs. · Choose healthy (unsaturated) fats, such as nuts, olive oil, and certain nut or seed oils like canola, soybean, and flaxseed. · Limit unhealthy (saturated) fats, such as butter, palm oil, and coconut oil.  And limit fats found in animal products, such as meat and dairy products made with whole milk. Try to eat red meat only a few times a month in very small amounts. · Limit sweets and desserts to only a few times a week. This includes sugar-sweetened drinks like soda. The Mediterranean diet may also include red wine with your meal--1 glass each day for women and up to 2 glasses a day for men. Tips for eating at home  · Use herbs, spices, garlic, lemon zest, and citrus juice instead of salt to add flavor to foods. · Add avocado slices to your sandwich instead of leal. · Have fish for lunch or dinner instead of red meat. Brush the fish with olive oil, and broil or grill it. · Sprinkle your salad with seeds or nuts instead of cheese. · Cook with olive or canola oil instead of butter or oils that are high in saturated fat. · Switch from 2% milk or whole milk to 1% or fat-free milk. · Dip raw vegetables in a vinaigrette dressing or hummus instead of dips made from mayonnaise or sour cream.  · Have a piece of fruit for dessert instead of a piece of cake. Try baked apples, or have some dried fruit. Tips for eating out  · Try broiled, grilled, baked, or poached fish instead of having it fried or breaded. · Ask your  to have your meals prepared with olive oil instead of butter. · Order dishes made with marinara sauce or sauces made from olive oil. Avoid sauces made from cream or mayonnaise. · Choose whole-grain breads, whole wheat pasta and pizza crust, brown rice, beans, and lentils. · Cut back on butter or margarine on bread. Instead, you can dip your bread in a small amount of olive oil. · Ask for a side salad or grilled vegetables instead of french fries or chips. Where can you learn more? Go to http://www.[employer].com/  Enter O407 in the search box to learn more about \"Learning About the Mediterranean Diet. \"  Current as of: December 17, 2020               Content Version: 13.0  © 1617-6177 [facility].    Care instructions adapted under license by [employer] (which disclaims liability or warranty for this information). If you have questions about a medical condition or this instruction, always ask your healthcare professional. [address] any warranty or liability for your use of this information.

## 2022-02-14 NOTE — PROGRESS NOTES
1. Have you been to the ER, urgent care clinic since your last visit? Hospitalized since your last visit? No    2. Have you seen or consulted any other health care providers outside of the [address] [place] [address] since your last visit? Include any pap smears or colon screening. Yes Where: [facility], Dr. Romero Panda PCP for follow-up     3. Since your last visit, have you had any of the following symptoms? chest pains. 4.  Have you had any blood work, X-rays or cardiac testing? Yes Where: [facility]     Requested: NO     In [facility]: YES    5. Where do you normally have your labs drawn? [facility]    6. Do you need any refills today?    No

## 2022-04-27 PROBLEM — J20.9 ACUTE BRONCHITIS: Status: ACTIVE | Noted: 2018-06-18

## 2022-04-27 PROBLEM — J04.0 ACUTE LARYNGITIS: Status: ACTIVE | Noted: 2018-10-31

## 2022-04-27 PROBLEM — S00.419A ABRASION OF EAR REGION: Status: ACTIVE | Noted: 2017-01-03

## 2022-04-27 PROBLEM — R60.0 LOCALIZED EDEMA: Status: ACTIVE | Noted: 2018-08-27

## 2022-04-27 PROBLEM — L20.9 ATOPIC DERMATITIS: Status: ACTIVE | Noted: 2020-09-25

## 2022-04-27 PROBLEM — R05.9 COUGH: Status: ACTIVE | Noted: 2018-06-18

## 2022-04-27 PROBLEM — M25.551 RIGHT HIP PAIN: Status: ACTIVE | Noted: 2017-10-26

## 2022-04-27 PROBLEM — R10.9 ABDOMINAL PAIN: Status: ACTIVE | Noted: 2020-02-24

## 2022-04-27 PROBLEM — K59.00 CONSTIPATION: Status: ACTIVE | Noted: 2020-06-01

## 2022-04-27 PROBLEM — M19.079 PRIMARY LOCALIZED OSTEOARTHROSIS OF ANKLE AND FOOT: Status: ACTIVE | Noted: 2020-09-25

## 2022-05-27 PROBLEM — R05.9 COUGH: Status: RESOLVED | Noted: 2018-06-18 | Resolved: 2022-05-27

## 2022-07-05 DIAGNOSIS — E78.00 PURE HYPERCHOLESTEROLEMIA: ICD-10-CM

## 2022-07-05 RX ORDER — ATORVASTATIN CALCIUM 40 MG/1
40 TABLET, FILM COATED ORAL DAILY
Qty: 90 TABLET | Refills: 1 | Status: SHIPPED | OUTPATIENT
Start: 2022-07-05

## 2022-07-05 RX ORDER — HYDROCHLOROTHIAZIDE 25 MG/1
TABLET ORAL
Qty: 120 TABLET | Refills: 1 | Status: SHIPPED | OUTPATIENT
Start: 2022-07-05

## 2023-01-06 DIAGNOSIS — E78.00 PURE HYPERCHOLESTEROLEMIA: ICD-10-CM

## 2023-01-06 DIAGNOSIS — I10 ESSENTIAL HYPERTENSION, BENIGN: ICD-10-CM

## 2023-01-06 RX ORDER — ATORVASTATIN CALCIUM 40 MG/1
TABLET, FILM COATED ORAL
Qty: 90 TABLET | Refills: 1 | Status: SHIPPED | OUTPATIENT
Start: 2023-01-06

## 2023-01-06 RX ORDER — LISINOPRIL 5 MG/1
TABLET ORAL
Qty: 90 TABLET | Refills: 2 | Status: SHIPPED | OUTPATIENT
Start: 2023-01-06

## 2023-02-13 ENCOUNTER — OFFICE VISIT (OUTPATIENT)
Age: 83
End: 2023-02-13
Payer: MEDICARE

## 2023-02-13 VITALS
HEART RATE: 74 BPM | BODY MASS INDEX: 29.62 KG/M2 | HEIGHT: 69 IN | DIASTOLIC BLOOD PRESSURE: 68 MMHG | OXYGEN SATURATION: 100 % | WEIGHT: 200 LBS | SYSTOLIC BLOOD PRESSURE: 123 MMHG

## 2023-02-13 DIAGNOSIS — I10 ESSENTIAL (PRIMARY) HYPERTENSION: ICD-10-CM

## 2023-02-13 DIAGNOSIS — E78.00 PURE HYPERCHOLESTEROLEMIA, UNSPECIFIED: ICD-10-CM

## 2023-02-13 DIAGNOSIS — I25.10 ATHEROSCLEROSIS OF NATIVE CORONARY ARTERY OF NATIVE HEART WITHOUT ANGINA PECTORIS: Primary | ICD-10-CM

## 2023-02-13 PROCEDURE — G8484 FLU IMMUNIZE NO ADMIN: HCPCS | Performed by: INTERNAL MEDICINE

## 2023-02-13 PROCEDURE — 1036F TOBACCO NON-USER: CPT | Performed by: INTERNAL MEDICINE

## 2023-02-13 PROCEDURE — G8417 CALC BMI ABV UP PARAM F/U: HCPCS | Performed by: INTERNAL MEDICINE

## 2023-02-13 PROCEDURE — G8428 CUR MEDS NOT DOCUMENT: HCPCS | Performed by: INTERNAL MEDICINE

## 2023-02-13 PROCEDURE — 99214 OFFICE O/P EST MOD 30 MIN: CPT | Performed by: INTERNAL MEDICINE

## 2023-02-13 PROCEDURE — 1123F ACP DISCUSS/DSCN MKR DOCD: CPT | Performed by: INTERNAL MEDICINE

## 2023-02-13 PROCEDURE — 3074F SYST BP LT 130 MM HG: CPT | Performed by: INTERNAL MEDICINE

## 2023-02-13 PROCEDURE — 3078F DIAST BP <80 MM HG: CPT | Performed by: INTERNAL MEDICINE

## 2023-02-13 ASSESSMENT — PATIENT HEALTH QUESTIONNAIRE - PHQ9
1. LITTLE INTEREST OR PLEASURE IN DOING THINGS: 0
SUM OF ALL RESPONSES TO PHQ QUESTIONS 1-9: 0
SUM OF ALL RESPONSES TO PHQ9 QUESTIONS 1 & 2: 0
DEPRESSION UNABLE TO ASSESS: FUNCTIONAL CAPACITY MOTIVATION LIMITS ACCURACY
2. FEELING DOWN, DEPRESSED OR HOPELESS: 0
SUM OF ALL RESPONSES TO PHQ QUESTIONS 1-9: 0

## 2023-02-13 ASSESSMENT — ENCOUNTER SYMPTOMS
RESPIRATORY NEGATIVE: 1
EYES NEGATIVE: 1
GASTROINTESTINAL NEGATIVE: 1

## 2023-02-13 NOTE — PROGRESS NOTES
Follow-up of CAD, hypertension, hyperlipidemia    2/2020 occasional left side chest pain with shoulder pain which is positional and resolves with repositiioning. 2/19 occasional left infra axillary chest pain which is positional.  Improves spontaneously and about 5 minutes. Left deltoid area pains off and on but likely positional as he quilts with extended arms; 5/16 resolved    Chest Pain   This is a recurrent problem. The current episode started more than 1 year ago. The problem occurs every several days. The pain is present in the lateral region (5 mts). The pain is mild. The quality of the pain is described as heavy. The pain radiates to the left arm. The pain is aggravated by movement. He has tried nothing for the symptoms. Review of Systems   Constitutional: Negative. HENT: Negative. Eyes: Negative. Respiratory: Negative. Cardiovascular:  Positive for chest pain. Gastrointestinal: Negative. Endocrine: Negative. Genitourinary: Negative. Musculoskeletal: Negative. Neurological: Negative. Psychiatric/Behavioral: Negative. All other systems reviewed and are negative. Allergies   Allergen Reactions    Econazole Nitrate Other (See Comments)    Penicillamine      Other reaction(s): Unknown (comments)    Penicillins Rash     Other reaction(s): Unknown (comments)  Other reaction(s): unknown       Past Medical History:   Diagnosis Date    Cancer of prostate ([facility] [address] 04/01/2012    radiation- Dr Anita Gonzalez hypertension     Gout     Obesity, unspecified     Weight loss has been strongly encouraged by following dietary restrictions and an exercise routine.     Other and unspecified hyperlipidemia     1/12 Low density lipoproteins (LDLs) are at goal, triglycerides are at goal, high density lipoproteins (HDLs) are at goal.    Personal history of prostate cancer        Family History   Problem Relation Age of Onset    Heart Attack Father 54    Heart Disease Other Positive family history of heart disesae    Sudden Death Neg Hx        Social History     Tobacco Use    Smoking status: Never    Smokeless tobacco: Never   Substance Use Topics    Alcohol use: No    Drug use: No        [unfilled]     Past Surgical History:   Procedure Laterality Date    BIOPSY PROSTATE         /68 (Site: Left Upper Arm, Position: Sitting, Cuff Size: Medium Adult)   Pulse 74   Ht 5' 9\" (1.753 m)   Wt 200 lb (90.7 kg)   SpO2 100%   BMI 29.53 kg/m²     Diagnostic Studies:  I have reviewed the relevant tests done on the patient and show as follows  EKG tracings reviewed by me today. No results found for this or any previous visit (from the past 4464 hour(s)). Xray Result (most recent):  No results found for this or any previous visit from the past 3650 days. 12/06/18 echo     12/06/2018  3:10 PM, 12/06/2018 12:00 AM (Final)    Narrative  This is a summary report. The complete report is available in the patient's medical record. If you cannot access the medical record, please contact the sending organization for a detailed fax or copy. · Estimated left ventricular ejection fraction is 56 - 60%. Normal left ventricular wall motion, no regional wall motion abnormality noted. · Normal right ventricular size and function. · Trace mitral valve regurgitation. · Mild tricuspid valve regurgitation is present. There is no evidence of pulmonary hypertension. · Mild pulmonic valve regurgitation is present. · Trivial pericardial effusion with fat pad noted. Signed by: Renny Quiñones MD on 12/6/2018  3:10 PM, Signed by: Unknown Provider Result on 12/6/2018 12:00 AM    12/06/18 stress test     12/06/2018  4:09 PM, 12/06/2018 12:00 AM (Final)    Narrative  This is a summary report. The complete report is available in the patient's medical record. If you cannot access the medical record, please contact the sending organization for a detailed fax or copy.     · Baseline ECG: Sinus rhythm. · Negative stress electrocardiogram.  · Gated SPECT: Left ventricular function post-stress was normal. Calculated ejection fraction is 90%. There is no evidence of transient ischemic dilation (TID). The TID ratio is 1.01.  · Left ventricular perfusion is normal.  · Negative myocardial perfusion imaging. Myocardial perfusion imaging supports a low risk stress test.  · Myocardial perfusion imaging defect 1: There is a defect that is small in size with a mild reduction in uptake present in the inferior location(s) that is non-reversible. There is normal wall motion in the defect area. Viability in the area is good. The defect appears to be an artifact caused by subdiaphragmatic activity. Signed by: Lora Ovalles MD on 12/6/2018  4:09 PM, Signed by: Unknown Provider Result on 12/6/2018 12:00 AM    No results found for this or any previous visit. Mr. Tesfaye Benson has a reminder for a \"due or due soon\" health maintenance. I have asked that he contact his primary care provider for follow-up on this health maintenance. Physical Exam  Vitals and nursing note reviewed. Constitutional:       Appearance: Normal appearance. HENT:      Head: Normocephalic and atraumatic. Nose: Nose normal.   Eyes:      Conjunctiva/sclera: Conjunctivae normal.   Cardiovascular:      Rate and Rhythm: Normal rate and regular rhythm. Pulses: Normal pulses. Heart sounds: Normal heart sounds. Pulmonary:      Effort: Pulmonary effort is normal.      Breath sounds: Normal breath sounds. Abdominal:      General: Bowel sounds are normal.      Palpations: Abdomen is soft. Musculoskeletal:         General: Normal range of motion. Right lower leg: No edema. Left lower leg: Edema (Forefoot only- tender) present. Skin:     General: Skin is warm and dry. Neurological:      General: No focal deficit present. Mental Status: He is alert and oriented to person, place, and time.    Psychiatric: Mood and Affect: Mood normal.         Behavior: Behavior normal.      ASSESSMENT and PLAN    Weight loss has been strongly encouraged by following dietary restrictions and an exercise routine. Component Ref Range & Units 2/2/22 0748 2/12/20 0733   Triglycerides 40 - 149 mg/dL 106  52    HDL >=40 mg/dL 59  62    Cholesterol, Total 110 - 200 mg/dL 157  148    HDL 0.0 - 5.0 NA 2.7  2.4    Non-HDL Cholesterol <130 mg/dL 98  86    LDL Calculated 50 - 99 mg/dL 77  76    VLDL Cholesterol Calculated 8 - 30 mg/dL 21  10    LDL/HDL Ratio NA 1.3  1.2 CM            CAD: 11/17; 11/16 stable symptoms; 2006 noncritical    2/2020  Stable cardiac status. Blood pressure is controlled. Due for lipids and LFTs which have been ordered. Continue current medications. 2/2021   Stable CAD. He denies chest pain, SOB, palpitations or edema. BMP 8/20 and lipids 2/20  reviewed and discussed with patient. Today's EKG - NSR  Continue current medications, encouraged to begin exercise routine. 2/14/2022 CAD stable without any significant symptoms. Blood pressure is controlled. Lipids are better and stable but should be lowered further. Change Zocor to Lipitor and follow the labs. Diet discussed and Mediterranean diet guidelines given. Cindy Alejandra was seen today for follow-up. Diagnoses and all orders for this visit:    Atherosclerosis of native coronary artery of native heart without angina pectoris    Essential (primary) hypertension    Pure hypercholesterolemia, unspecified  -     Hepatic Function Panel; Future  -     Lipid Panel; Future      Pertinent laboratory and test data reviewed and discussed with patient. See patient instructions also for other medical advice given    There are no discontinued medications.     Follow-up and Dispositions    Return in about 6 months (around 8/13/2023), or if symptoms worsen or fail to improve, for post test.           Return to ER if any significant CP not relieved by s/l NTG, severe SOB, severe palpitations, loss of consciousness    2/13/2023 CAD stable. Blood pressure is controlled  Lipids were not at goal last year and will be followed this year since his statins were changed already. Exercise and diet reinforced. Mediterranean diet guidelines given.

## 2023-07-03 RX ORDER — ATORVASTATIN CALCIUM 40 MG/1
TABLET, FILM COATED ORAL
Qty: 90 TABLET | Refills: 3 | Status: SHIPPED | OUTPATIENT
Start: 2023-07-03

## 2023-08-31 ENCOUNTER — OFFICE VISIT (OUTPATIENT)
Age: 83
End: 2023-08-31
Payer: MEDICARE

## 2023-08-31 VITALS
HEART RATE: 80 BPM | WEIGHT: 191 LBS | HEIGHT: 69 IN | SYSTOLIC BLOOD PRESSURE: 123 MMHG | OXYGEN SATURATION: 99 % | DIASTOLIC BLOOD PRESSURE: 70 MMHG | BODY MASS INDEX: 28.29 KG/M2

## 2023-08-31 DIAGNOSIS — I10 ESSENTIAL (PRIMARY) HYPERTENSION: ICD-10-CM

## 2023-08-31 DIAGNOSIS — I25.10 CORONARY ARTERY DISEASE INVOLVING NATIVE CORONARY ARTERY OF NATIVE HEART WITHOUT ANGINA PECTORIS: Primary | ICD-10-CM

## 2023-08-31 DIAGNOSIS — E78.00 PURE HYPERCHOLESTEROLEMIA: ICD-10-CM

## 2023-08-31 PROCEDURE — 1123F ACP DISCUSS/DSCN MKR DOCD: CPT | Performed by: INTERNAL MEDICINE

## 2023-08-31 PROCEDURE — G8427 DOCREV CUR MEDS BY ELIG CLIN: HCPCS | Performed by: INTERNAL MEDICINE

## 2023-08-31 PROCEDURE — 3074F SYST BP LT 130 MM HG: CPT | Performed by: INTERNAL MEDICINE

## 2023-08-31 PROCEDURE — 99214 OFFICE O/P EST MOD 30 MIN: CPT | Performed by: INTERNAL MEDICINE

## 2023-08-31 PROCEDURE — 1036F TOBACCO NON-USER: CPT | Performed by: INTERNAL MEDICINE

## 2023-08-31 PROCEDURE — 3078F DIAST BP <80 MM HG: CPT | Performed by: INTERNAL MEDICINE

## 2023-08-31 PROCEDURE — G8417 CALC BMI ABV UP PARAM F/U: HCPCS | Performed by: INTERNAL MEDICINE

## 2023-08-31 ASSESSMENT — PATIENT HEALTH QUESTIONNAIRE - PHQ9
SUM OF ALL RESPONSES TO PHQ QUESTIONS 1-9: 0
SUM OF ALL RESPONSES TO PHQ QUESTIONS 1-9: 0
1. LITTLE INTEREST OR PLEASURE IN DOING THINGS: 0
SUM OF ALL RESPONSES TO PHQ QUESTIONS 1-9: 0
2. FEELING DOWN, DEPRESSED OR HOPELESS: 0
SUM OF ALL RESPONSES TO PHQ QUESTIONS 1-9: 0
SUM OF ALL RESPONSES TO PHQ9 QUESTIONS 1 & 2: 0
DEPRESSION UNABLE TO ASSESS: FUNCTIONAL CAPACITY MOTIVATION LIMITS ACCURACY

## 2023-08-31 ASSESSMENT — ENCOUNTER SYMPTOMS
EYES NEGATIVE: 1
GASTROINTESTINAL NEGATIVE: 1
RESPIRATORY NEGATIVE: 1

## 2023-08-31 NOTE — PROGRESS NOTES
1. Have you been to the ER, urgent care clinic since your last visit? Hospitalized since your last visit? No      2. Where do you normally have your labs drawn? [facility]    3. Do you need any refills today? NO    4. Which local pharmacy do you use (enter pharmacy)? [facility]    5. Which mail order pharmacy do you use (enter pharmacy)? No     6. Are you here for surgical clearance and if so who will be doing your     procedure/surgery (care team)?    No
normal.         Behavior: Behavior normal.      Allergies   Allergen Reactions    Econazole Nitrate Other (See Comments)    Penicillamine      Other reaction(s): Unknown (comments)    Penicillins Rash     Other reaction(s): Unknown (comments)  Other reaction(s): unknown       Past Medical History:   Diagnosis Date    Cancer of prostate ([address]) 04/01/2012    radiation- Dr Ki Mata hypertension     Gout     Obesity, unspecified     Weight loss has been strongly encouraged by following dietary restrictions and an exercise routine. Other and unspecified hyperlipidemia     1/12 Low density lipoproteins (LDLs) are at goal, triglycerides are at goal, high density lipoproteins (HDLs) are at goal.    Personal history of prostate cancer        Family History   Problem Relation Age of Onset    Heart Attack Father 54    Heart Disease Other         Positive family history of heart disesae    Sudden Death Neg Hx        Social History     Tobacco Use    Smoking status: Never    Smokeless tobacco: Never   Substance Use Topics    Alcohol use: No    Drug use: No        Current Outpatient Medications   Medication Sig Dispense Refill    atorvastatin (LIPITOR) 40 MG tablet take 1 tablet by mouth once daily 90 tablet 3    Glycerin-Polysorbate 80 (REFRESH DRY EYE THERAPY OP) Apply to eye      acetaminophen (TYLENOL) 650 MG extended release tablet Take 1 tablet by mouth in the morning and 1 tablet at noon and 1 tablet in the evening. aspirin 81 MG EC tablet Take by mouth daily      Calcium Carbonate-Vitamin D 600-3. 125 MG-MCG TABS Take by mouth      Cholecalciferol 50 MCG (2000 UT) TABS Take 50 tablets by mouth      hydroCHLOROthiazide (HYDRODIURIL) 25 MG tablet take 1/2 tablet by mouth once daily      ketorolac (ACULAR) 0.5 % ophthalmic solution 1 DROP      lisinopril (PRINIVIL;ZESTRIL) 5 MG tablet take 1 tablet by mouth once daily      metoprolol tartrate (LOPRESSOR) 50 MG tablet Take by mouth 2 times daily

## 2023-10-02 RX ORDER — HYDROCHLOROTHIAZIDE 25 MG/1
TABLET ORAL
Qty: 120 TABLET | Refills: 1 | Status: SHIPPED | OUTPATIENT
Start: 2023-10-02

## 2023-10-02 RX ORDER — LISINOPRIL 5 MG/1
TABLET ORAL
Qty: 90 TABLET | Refills: 1 | Status: SHIPPED | OUTPATIENT
Start: 2023-10-02

## 2024-05-06 RX ORDER — LISINOPRIL 5 MG/1
TABLET ORAL
Qty: 90 TABLET | Refills: 3 | Status: SHIPPED | OUTPATIENT
Start: 2024-05-06

## 2024-07-16 DIAGNOSIS — E78.00 PURE HYPERCHOLESTEROLEMIA: Primary | ICD-10-CM

## 2024-07-16 RX ORDER — ATORVASTATIN CALCIUM 40 MG/1
40 TABLET, FILM COATED ORAL DAILY
Qty: 90 TABLET | Refills: 3 | Status: SHIPPED | OUTPATIENT
Start: 2024-07-16

## 2024-07-16 NOTE — TELEPHONE ENCOUNTER
----- Message from Luh Nava sent at 7/16/2024 10:34 AM EDT -----  Regarding: REFILLS REQUESTED  atorvastatin (LIPITOR) 40 MG tablet, [facility] [address] [place] [address], 90 DAY SUPPLY.

## 2024-07-31 ENCOUNTER — OFFICE VISIT (OUTPATIENT)
Age: 84
End: 2024-07-31

## 2024-07-31 VITALS
HEART RATE: 69 BPM | OXYGEN SATURATION: 99 % | DIASTOLIC BLOOD PRESSURE: 79 MMHG | HEIGHT: 69 IN | SYSTOLIC BLOOD PRESSURE: 122 MMHG | BODY MASS INDEX: 27.25 KG/M2 | WEIGHT: 184 LBS

## 2024-07-31 DIAGNOSIS — I10 ESSENTIAL (PRIMARY) HYPERTENSION: ICD-10-CM

## 2024-07-31 DIAGNOSIS — I25.118 ATHEROSCLEROSIS OF NATIVE CORONARY ARTERY OF NATIVE HEART WITH OTHER FORM OF ANGINA PECTORIS (HCC): Primary | ICD-10-CM

## 2024-07-31 DIAGNOSIS — R07.9 CHEST PAIN, UNSPECIFIED TYPE: ICD-10-CM

## 2024-07-31 DIAGNOSIS — E78.00 PURE HYPERCHOLESTEROLEMIA: ICD-10-CM

## 2024-07-31 ASSESSMENT — PATIENT HEALTH QUESTIONNAIRE - PHQ9
SUM OF ALL RESPONSES TO PHQ QUESTIONS 1-9: 0
SUM OF ALL RESPONSES TO PHQ QUESTIONS 1-9: 0
1. LITTLE INTEREST OR PLEASURE IN DOING THINGS: NOT AT ALL
SUM OF ALL RESPONSES TO PHQ QUESTIONS 1-9: 0
DEPRESSION UNABLE TO ASSESS: FUNCTIONAL CAPACITY MOTIVATION LIMITS ACCURACY
SUM OF ALL RESPONSES TO PHQ QUESTIONS 1-9: 0
2. FEELING DOWN, DEPRESSED OR HOPELESS: NOT AT ALL
SUM OF ALL RESPONSES TO PHQ9 QUESTIONS 1 & 2: 0

## 2024-07-31 ASSESSMENT — ENCOUNTER SYMPTOMS
EYES NEGATIVE: 1
RESPIRATORY NEGATIVE: 1
GASTROINTESTINAL NEGATIVE: 1

## 2024-07-31 NOTE — PROGRESS NOTES
1. Have you been to the ER, urgent care clinic since your last visit?  Hospitalized since your last visit?     No      2.  Where do you normally have your labs drawn?   [facility]    3. Do you need any refills today?   No    4. Which local pharmacy do you use (enter pharmacy)?   [facility]    5. Which mail order pharmacy do you use (enter pharmacy)?   No     6. Are you here for surgical clearance and if so who will be doing your     procedure/surgery (care team)?   No      
with fat pad noted.    Signed by: Michael Rachel MD on 12/6/2018  3:10 PM, Signed by: Unknown Provider Result on 12/6/2018 12:00 AM    12/06/18 stress test     12/06/2018  4:09 PM, 12/06/2018 12:00 AM (Final)    · Baseline ECG: Sinus rhythm.  · Negative stress electrocardiogram.  · Gated SPECT: Left ventricular function post-stress was normal. Calculated ejection fraction is 90%. There is no evidence of transient ischemic dilation (TID).The TID ratio is 1.01.  · Left ventricular perfusion is normal.  · Negative myocardial perfusion imaging. Myocardial perfusion imaging supports a low risk stress test.  · Myocardial perfusion imaging defect 1: There is a defect that is small in size with a mild reduction in uptake present in the inferior location(s) that is non-reversible. There is normal wall motion in the defect area. Viability in the area is good. The defect appears to be an artifact caused by subdiaphragmatic activity.    Signed by: Rosita Sierra MD on 12/6/2018  4:09 PM, Signed by: Unknown Provider Result on 12/6/2018 12:00 AM    No results found for this or any previous visit.        Mr. Jason has a reminder for a \"due or due soon\" health maintenance. I have asked that he contact his primary care provider for follow-up on this health maintenance.      ASSESSMENT & PLAN    Lab Results   Component Value Date    CHOL 118 02/16/2023    CHOL 157 02/02/2022    CHOL 148 02/12/2020     Lab Results   Component Value Date    TRIG 74 02/16/2023    TRIG 106 02/02/2022    TRIG 52 02/12/2020     Lab Results   Component Value Date    HDL 49 02/16/2023    HDL 59 02/02/2022    HDL 2.7 02/02/2022    HDL 62 02/12/2020    HDL 2.4 02/12/2020     Lab Results   Component Value Date    LDL 54 02/16/2023    LDL 77 02/02/2022    LDL 76 02/12/2020     Lab Results   Component Value Date    VLDL 15 02/16/2023    VLDL 21 02/02/2022    VLDL 10 02/12/2020       Pertinent laboratory and test data reviewed and discussed with

## 2024-09-05 ENCOUNTER — TELEPHONE (OUTPATIENT)
Age: 84
End: 2024-09-05

## 2024-09-05 NOTE — TELEPHONE ENCOUNTER
Patient nuc was little abnormal per Dr. Carpenter. Please advise if you need to see patient earlier than 10/24/24.

## 2024-09-06 NOTE — TELEPHONE ENCOUNTER
Spoke to patient regarding abn nuc per Dr. Carpenter. Test reviewed.  Please see Dr. Rachel sooner would be better for this one. He voices understanding and acceptance of this advice and will call back if any further questions or concerns.

## 2024-09-12 ENCOUNTER — OFFICE VISIT (OUTPATIENT)
Age: 84
End: 2024-09-12
Payer: MEDICARE

## 2024-09-12 VITALS
DIASTOLIC BLOOD PRESSURE: 58 MMHG | BODY MASS INDEX: 27.4 KG/M2 | OXYGEN SATURATION: 98 % | WEIGHT: 185 LBS | HEART RATE: 70 BPM | HEIGHT: 69 IN | SYSTOLIC BLOOD PRESSURE: 130 MMHG

## 2024-09-12 DIAGNOSIS — E78.00 PURE HYPERCHOLESTEROLEMIA: ICD-10-CM

## 2024-09-12 DIAGNOSIS — R07.9 CHEST PAIN, UNSPECIFIED TYPE: Primary | ICD-10-CM

## 2024-09-12 DIAGNOSIS — I10 ESSENTIAL (PRIMARY) HYPERTENSION: ICD-10-CM

## 2024-09-12 PROCEDURE — 1123F ACP DISCUSS/DSCN MKR DOCD: CPT | Performed by: INTERNAL MEDICINE

## 2024-09-12 PROCEDURE — G8417 CALC BMI ABV UP PARAM F/U: HCPCS | Performed by: INTERNAL MEDICINE

## 2024-09-12 PROCEDURE — 3078F DIAST BP <80 MM HG: CPT | Performed by: INTERNAL MEDICINE

## 2024-09-12 PROCEDURE — G8427 DOCREV CUR MEDS BY ELIG CLIN: HCPCS | Performed by: INTERNAL MEDICINE

## 2024-09-12 PROCEDURE — 1036F TOBACCO NON-USER: CPT | Performed by: INTERNAL MEDICINE

## 2024-09-12 PROCEDURE — 99214 OFFICE O/P EST MOD 30 MIN: CPT | Performed by: INTERNAL MEDICINE

## 2024-09-12 PROCEDURE — 3074F SYST BP LT 130 MM HG: CPT | Performed by: INTERNAL MEDICINE

## 2024-09-12 ASSESSMENT — PATIENT HEALTH QUESTIONNAIRE - PHQ9
SUM OF ALL RESPONSES TO PHQ9 QUESTIONS 1 & 2: 0
DEPRESSION UNABLE TO ASSESS: FUNCTIONAL CAPACITY MOTIVATION LIMITS ACCURACY
SUM OF ALL RESPONSES TO PHQ QUESTIONS 1-9: 0
1. LITTLE INTEREST OR PLEASURE IN DOING THINGS: NOT AT ALL
SUM OF ALL RESPONSES TO PHQ QUESTIONS 1-9: 0
2. FEELING DOWN, DEPRESSED OR HOPELESS: NOT AT ALL

## 2024-09-12 ASSESSMENT — ENCOUNTER SYMPTOMS
EYES NEGATIVE: 1
RESPIRATORY NEGATIVE: 1
GASTROINTESTINAL NEGATIVE: 1

## 2024-09-30 LAB
CHOLESTEROL, TOTAL: 121 MG/DL (ref 110–200)
CHOLESTEROL/HDL RATIO: 2.5 (ref 0–5)
HDLC SERPL-MCNC: 49 MG/DL
LDL CHOLESTEROL: 59 MG/DL (ref 50–99)
LDL/HDL RATIO: 1.2
NON-HDL CHOLESTEROL: 72 MG/DL
TRIGL SERPL-MCNC: 65 MG/DL (ref 40–149)
VLDLC SERPL CALC-MCNC: 13 MG/DL (ref 8–30)

## 2024-10-07 RX ORDER — HYDROCHLOROTHIAZIDE 25 MG/1
TABLET ORAL
Qty: 120 TABLET | Refills: 2 | Status: SHIPPED | OUTPATIENT
Start: 2024-10-07

## 2025-04-18 RX ORDER — LISINOPRIL 5 MG/1
5 TABLET ORAL DAILY
Qty: 90 TABLET | Refills: 3 | Status: SHIPPED | OUTPATIENT
Start: 2025-04-18

## 2025-05-30 ENCOUNTER — OFFICE VISIT (OUTPATIENT)
Age: 85
End: 2025-05-30
Payer: MEDICARE

## 2025-05-30 VITALS
BODY MASS INDEX: 28.44 KG/M2 | HEART RATE: 68 BPM | HEIGHT: 69 IN | SYSTOLIC BLOOD PRESSURE: 115 MMHG | WEIGHT: 192 LBS | OXYGEN SATURATION: 100 % | DIASTOLIC BLOOD PRESSURE: 62 MMHG

## 2025-05-30 DIAGNOSIS — E78.00 PURE HYPERCHOLESTEROLEMIA: ICD-10-CM

## 2025-05-30 DIAGNOSIS — I10 ESSENTIAL (PRIMARY) HYPERTENSION: Primary | ICD-10-CM

## 2025-05-30 DIAGNOSIS — R60.9 EDEMA, UNSPECIFIED TYPE: ICD-10-CM

## 2025-05-30 DIAGNOSIS — R07.9 CHEST PAIN, UNSPECIFIED TYPE: ICD-10-CM

## 2025-05-30 PROCEDURE — 1036F TOBACCO NON-USER: CPT | Performed by: INTERNAL MEDICINE

## 2025-05-30 PROCEDURE — G8427 DOCREV CUR MEDS BY ELIG CLIN: HCPCS | Performed by: INTERNAL MEDICINE

## 2025-05-30 PROCEDURE — 1160F RVW MEDS BY RX/DR IN RCRD: CPT | Performed by: INTERNAL MEDICINE

## 2025-05-30 PROCEDURE — G8417 CALC BMI ABV UP PARAM F/U: HCPCS | Performed by: INTERNAL MEDICINE

## 2025-05-30 PROCEDURE — 99214 OFFICE O/P EST MOD 30 MIN: CPT | Performed by: INTERNAL MEDICINE

## 2025-05-30 PROCEDURE — 1126F AMNT PAIN NOTED NONE PRSNT: CPT | Performed by: INTERNAL MEDICINE

## 2025-05-30 PROCEDURE — 1123F ACP DISCUSS/DSCN MKR DOCD: CPT | Performed by: INTERNAL MEDICINE

## 2025-05-30 PROCEDURE — 1159F MED LIST DOCD IN RCRD: CPT | Performed by: INTERNAL MEDICINE

## 2025-05-30 RX ORDER — LISINOPRIL 2.5 MG/1
2.5 TABLET ORAL DAILY
Qty: 90 TABLET | Refills: 3 | Status: SHIPPED | OUTPATIENT
Start: 2025-05-30

## 2025-05-30 ASSESSMENT — PATIENT HEALTH QUESTIONNAIRE - PHQ9
1. LITTLE INTEREST OR PLEASURE IN DOING THINGS: NOT AT ALL
2. FEELING DOWN, DEPRESSED OR HOPELESS: NOT AT ALL
SUM OF ALL RESPONSES TO PHQ QUESTIONS 1-9: 0

## 2025-05-30 ASSESSMENT — ENCOUNTER SYMPTOMS
EYES NEGATIVE: 1
RESPIRATORY NEGATIVE: 1
GASTROINTESTINAL NEGATIVE: 1

## 2025-05-30 NOTE — PROGRESS NOTES
1. Have you been to the ER, urgent care clinic since your last visit?  Hospitalized since your last visit?     No      2.  Where do you normally have your labs drawn?   [facility]    3. Do you need any refills today?   No    4. Which local pharmacy do you use (enter pharmacy)?   [facility]    5. Which mail order pharmacy do you use (enter pharmacy)?   No     6. Are you here for surgical clearance and if so who will be doing your     procedure/surgery (care team)?   No

## 2025-05-30 NOTE — PROGRESS NOTES
Follow-up of CAD, hypertension, hyperlipidemia    2/2020 occasional left side chest pain with shoulder pain which is positional and resolves with repositiioning.   2/19 occasional left infra axillary chest pain which is positional.  Improves spontaneously and about 5 minutes.  Left deltoid area pains off and on but likely positional as he quilts with extended arms; 5/16 resolved  8/31/2023 left shoulder and left axillary area continues to have positional discomfort.  No definite chest pains.  No dyspnea PND orthopnea edema dizziness or loss of consciousness.  Has lost significant weight and was congratulated.  7/31/2024 complains of occasional left axillary/infra axillary chest pain which last for a few seconds usually.  No associated dyspnea PND orthopnea edema dizziness or palpitations.  Using a stick to walk because of arthritis.  9/12/2024 called sooner due to abnormal report of nuclear stress test reported with generalized reduction of uptake in the stress images.  I reviewed the pictures and there appears to be a small fixed apical defect with normal wall motion rest of the perfusion appears to be normal.  Patient has the same kind of chest discomfort that he described last time.  It is positional at times and at times it gets better with a burp.  No associated dyspnea dizziness orthopnea edema.  5/30/2025 complains of left axillary discomfort when he is sitting in the car with seatbelt which improves if he moves the seatbelt.  The symptoms do not happen when he is walking around.  No associated nausea vomiting dizziness or shortness of breath.  Gets dizzy if he lies down flat in the bed, so he prefers to sleep on the recliner.  Has minimal edema on the ankles most of the times.          Review of Systems   Constitutional: Negative.    HENT: Negative.     Eyes: Negative.    Respiratory: Negative.     Cardiovascular:  Positive for chest pain.   Gastrointestinal: Negative.    Endocrine: Negative.

## 2025-07-21 DIAGNOSIS — E78.00 PURE HYPERCHOLESTEROLEMIA: ICD-10-CM

## 2025-07-21 RX ORDER — ATORVASTATIN CALCIUM 40 MG/1
40 TABLET, FILM COATED ORAL DAILY
Qty: 90 TABLET | Refills: 3 | Status: SHIPPED | OUTPATIENT
Start: 2025-07-21

## 2025-08-15 ENCOUNTER — OFFICE VISIT (OUTPATIENT)
Age: 85
End: 2025-08-15
Payer: MEDICARE

## 2025-08-15 VITALS
SYSTOLIC BLOOD PRESSURE: 125 MMHG | HEART RATE: 74 BPM | HEIGHT: 69 IN | OXYGEN SATURATION: 99 % | DIASTOLIC BLOOD PRESSURE: 67 MMHG | BODY MASS INDEX: 28.26 KG/M2 | WEIGHT: 190.8 LBS

## 2025-08-15 DIAGNOSIS — I25.10 CORONARY ARTERY DISEASE INVOLVING NATIVE CORONARY ARTERY OF NATIVE HEART WITHOUT ANGINA PECTORIS: Primary | ICD-10-CM

## 2025-08-15 DIAGNOSIS — R42 DIZZINESS: ICD-10-CM

## 2025-08-15 DIAGNOSIS — I10 ESSENTIAL (PRIMARY) HYPERTENSION: ICD-10-CM

## 2025-08-15 DIAGNOSIS — E78.00 PURE HYPERCHOLESTEROLEMIA: ICD-10-CM

## 2025-08-15 PROCEDURE — 99214 OFFICE O/P EST MOD 30 MIN: CPT | Performed by: INTERNAL MEDICINE

## 2025-08-15 PROCEDURE — G8427 DOCREV CUR MEDS BY ELIG CLIN: HCPCS | Performed by: INTERNAL MEDICINE

## 2025-08-15 PROCEDURE — 1126F AMNT PAIN NOTED NONE PRSNT: CPT | Performed by: INTERNAL MEDICINE

## 2025-08-15 PROCEDURE — 1159F MED LIST DOCD IN RCRD: CPT | Performed by: INTERNAL MEDICINE

## 2025-08-15 PROCEDURE — 1160F RVW MEDS BY RX/DR IN RCRD: CPT | Performed by: INTERNAL MEDICINE

## 2025-08-15 PROCEDURE — 1036F TOBACCO NON-USER: CPT | Performed by: INTERNAL MEDICINE

## 2025-08-15 PROCEDURE — G8417 CALC BMI ABV UP PARAM F/U: HCPCS | Performed by: INTERNAL MEDICINE

## 2025-08-15 PROCEDURE — 1123F ACP DISCUSS/DSCN MKR DOCD: CPT | Performed by: INTERNAL MEDICINE

## 2025-08-15 ASSESSMENT — ENCOUNTER SYMPTOMS
EYES NEGATIVE: 1
GASTROINTESTINAL NEGATIVE: 1
RESPIRATORY NEGATIVE: 1